# Patient Record
Sex: MALE | Race: WHITE | HISPANIC OR LATINO | URBAN - METROPOLITAN AREA
[De-identification: names, ages, dates, MRNs, and addresses within clinical notes are randomized per-mention and may not be internally consistent; named-entity substitution may affect disease eponyms.]

---

## 2018-01-12 ENCOUNTER — OFFICE (OUTPATIENT)
Dept: URBAN - METROPOLITAN AREA CLINIC 75 | Facility: CLINIC | Age: 23
End: 2018-01-12

## 2018-01-12 VITALS
WEIGHT: 156 LBS | HEIGHT: 65 IN | SYSTOLIC BLOOD PRESSURE: 120 MMHG | DIASTOLIC BLOOD PRESSURE: 70 MMHG | HEART RATE: 91 BPM

## 2018-01-12 DIAGNOSIS — R13.10 DYSPHAGIA, UNSPECIFIED: ICD-10-CM

## 2018-01-12 DIAGNOSIS — R11.0 NAUSEA: ICD-10-CM

## 2018-01-12 DIAGNOSIS — R63.4 ABNORMAL WEIGHT LOSS: ICD-10-CM

## 2018-01-12 DIAGNOSIS — R13.19 OTHER DYSPHAGIA: ICD-10-CM

## 2018-01-12 PROCEDURE — 99203 OFFICE O/P NEW LOW 30 MIN: CPT

## 2018-01-22 ENCOUNTER — ON CAMPUS - OUTPATIENT (OUTPATIENT)
Dept: URBAN - METROPOLITAN AREA HOSPITAL 108 | Facility: HOSPITAL | Age: 23
End: 2018-01-22
Payer: COMMERCIAL

## 2018-01-22 DIAGNOSIS — K29.70 GASTRITIS, UNSPECIFIED, WITHOUT BLEEDING: ICD-10-CM

## 2018-01-22 DIAGNOSIS — R13.19 OTHER DYSPHAGIA: ICD-10-CM

## 2018-01-22 PROCEDURE — 43450 DILATE ESOPHAGUS 1/MULT PASS: CPT

## 2018-01-22 PROCEDURE — 43239 EGD BIOPSY SINGLE/MULTIPLE: CPT

## 2021-01-29 ENCOUNTER — OFFICE VISIT (OUTPATIENT)
Dept: FAMILY MEDICINE CLINIC | Facility: CLINIC | Age: 26
End: 2021-01-29

## 2021-01-29 VITALS
HEIGHT: 71 IN | SYSTOLIC BLOOD PRESSURE: 123 MMHG | WEIGHT: 158.2 LBS | BODY MASS INDEX: 22.15 KG/M2 | OXYGEN SATURATION: 95 % | TEMPERATURE: 98.2 F | HEART RATE: 94 BPM | DIASTOLIC BLOOD PRESSURE: 73 MMHG

## 2021-01-29 DIAGNOSIS — K76.0 FATTY LIVER: ICD-10-CM

## 2021-01-29 DIAGNOSIS — M54.50 BILATERAL LOW BACK PAIN WITHOUT SCIATICA, UNSPECIFIED CHRONICITY: ICD-10-CM

## 2021-01-29 DIAGNOSIS — R35.0 URINARY FREQUENCY: Primary | ICD-10-CM

## 2021-01-29 PROCEDURE — 99203 OFFICE O/P NEW LOW 30 MIN: CPT | Performed by: FAMILY MEDICINE

## 2021-01-29 RX ORDER — BACLOFEN 10 MG/1
10 TABLET ORAL 3 TIMES DAILY PRN
Qty: 60 TABLET | Refills: 1 | Status: SHIPPED | OUTPATIENT
Start: 2021-01-29 | End: 2021-03-15 | Stop reason: SDUPTHER

## 2021-01-29 RX ORDER — CLONAZEPAM 0.5 MG/1
0.5 TABLET ORAL 2 TIMES DAILY
COMMUNITY
Start: 2021-01-03 | End: 2021-11-18

## 2021-01-29 RX ORDER — BACLOFEN 10 MG/1
1 TABLET ORAL 3 TIMES DAILY
COMMUNITY
Start: 2021-01-03 | End: 2021-01-29 | Stop reason: SDUPTHER
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
## 2021-01-31 LAB
25(OH)D3+25(OH)D2 SERPL-MCNC: 24 NG/ML (ref 30–100)
ALBUMIN SERPL-MCNC: 5.1 G/DL (ref 3.5–5.2)
ALBUMIN/GLOB SERPL: 2.2 G/DL
ALP SERPL-CCNC: 57 U/L (ref 39–117)
ALT SERPL-CCNC: 56 U/L (ref 1–41)
ANA SER QL: NEGATIVE
AST SERPL-CCNC: 24 U/L (ref 1–40)
BASOPHILS # BLD AUTO: 0.02 10*3/MM3 (ref 0–0.2)
BASOPHILS NFR BLD AUTO: 0.4 % (ref 0–1.5)
BILIRUB SERPL-MCNC: 0.5 MG/DL (ref 0–1.2)
BUN SERPL-MCNC: 12 MG/DL (ref 6–20)
BUN/CREAT SERPL: 15.4 (ref 7–25)
CALCIUM SERPL-MCNC: 9.8 MG/DL (ref 8.6–10.5)
CHLORIDE SERPL-SCNC: 103 MMOL/L (ref 98–107)
CK BB CFR SERPL ELPH: 0 %
CK MACRO1 CFR SERPL: 0 %
CK MACRO2 CFR SERPL: 0 %
CK MB CFR SERPL ELPH: 0 % (ref 0–3)
CK MM CFR SERPL ELPH: 100 % (ref 97–100)
CK SERPL-CCNC: 105 U/L (ref 49–439)
CO2 SERPL-SCNC: 30.8 MMOL/L (ref 22–29)
CREAT SERPL-MCNC: 0.78 MG/DL (ref 0.76–1.27)
EOSINOPHIL # BLD AUTO: 0.04 10*3/MM3 (ref 0–0.4)
EOSINOPHIL NFR BLD AUTO: 0.9 % (ref 0.3–6.2)
ERYTHROCYTE [DISTWIDTH] IN BLOOD BY AUTOMATED COUNT: 13.2 % (ref 12.3–15.4)
ERYTHROCYTE [SEDIMENTATION RATE] IN BLOOD BY WESTERGREN METHOD: 1 MM/HR (ref 0–15)
GLOBULIN SER CALC-MCNC: 2.3 GM/DL
GLUCOSE SERPL-MCNC: 86 MG/DL (ref 65–99)
HCT VFR BLD AUTO: 44.3 % (ref 37.5–51)
HGB BLD-MCNC: 15 G/DL (ref 13–17.7)
HSV1 IGG SER IA-ACNC: 5.79 INDEX (ref 0–0.9)
HSV2 IGG SER IA-ACNC: <0.91 INDEX (ref 0–0.9)
IMM GRANULOCYTES # BLD AUTO: 0.01 10*3/MM3 (ref 0–0.05)
IMM GRANULOCYTES NFR BLD AUTO: 0.2 % (ref 0–0.5)
LYMPHOCYTES # BLD AUTO: 1.78 10*3/MM3 (ref 0.7–3.1)
LYMPHOCYTES NFR BLD AUTO: 38.5 % (ref 19.6–45.3)
MCH RBC QN AUTO: 29.2 PG (ref 26.6–33)
MCHC RBC AUTO-ENTMCNC: 33.9 G/DL (ref 31.5–35.7)
MCV RBC AUTO: 86.4 FL (ref 79–97)
MONOCYTES # BLD AUTO: 0.32 10*3/MM3 (ref 0.1–0.9)
MONOCYTES NFR BLD AUTO: 6.9 % (ref 5–12)
NEUTROPHILS # BLD AUTO: 2.45 10*3/MM3 (ref 1.7–7)
NEUTROPHILS NFR BLD AUTO: 53.1 % (ref 42.7–76)
NRBC BLD AUTO-RTO: 0 /100 WBC (ref 0–0.2)
PLATELET # BLD AUTO: 278 10*3/MM3 (ref 140–450)
POTASSIUM SERPL-SCNC: 4.2 MMOL/L (ref 3.5–5.2)
PROT SERPL-MCNC: 7.4 G/DL (ref 6–8.5)
RBC # BLD AUTO: 5.13 10*6/MM3 (ref 4.14–5.8)
RHEUMATOID FACT SERPL-ACNC: <10 IU/ML (ref 0–13.9)
SODIUM SERPL-SCNC: 141 MMOL/L (ref 136–145)
TSH SERPL DL<=0.005 MIU/L-ACNC: 1.27 UIU/ML (ref 0.27–4.2)
URATE SERPL-MCNC: 4.7 MG/DL (ref 3.4–7)
VIT B12 SERPL-MCNC: 533 PG/ML (ref 211–946)
WBC # BLD AUTO: 4.62 10*3/MM3 (ref 3.4–10.8)

## 2021-02-01 ENCOUNTER — TELEPHONE (OUTPATIENT)
Dept: FAMILY MEDICINE CLINIC | Facility: CLINIC | Age: 26
End: 2021-02-01

## 2021-02-01 DIAGNOSIS — R79.89 LOW VITAMIN D LEVEL: Primary | ICD-10-CM

## 2021-02-01 DIAGNOSIS — K76.0 FATTY LIVER: Primary | ICD-10-CM

## 2021-02-01 RX ORDER — ERGOCALCIFEROL 1.25 MG/1
50000 CAPSULE ORAL
Qty: 12 CAPSULE | Refills: 1 | Status: SHIPPED | OUTPATIENT
Start: 2021-02-01 | End: 2021-07-31

## 2021-02-01 NOTE — TELEPHONE ENCOUNTER
RUPAL Jenkinsay for HUB to give message     vitamin D is low, vitamin D weekly sent, recheck in 6 weeks, ALT is elevated, patient needs to stop by fasting for cholesterol panel, also he is positive for herpes type I, cold sores, in the past, rest of the labs are all negative     Should patient have any questions, he can call Dr. Benitez

## 2021-02-01 NOTE — TELEPHONE ENCOUNTER
----- Message from Finesse Bauer MD sent at 2/1/2021  7:27 AM EST -----  Please call the patient regarding his abnormal result.  I did call patient, no answer, left a message to call me back, vitamin D is low, vitamin D weekly sent, recheck in 6 weeks, ALT is elevated, patient needs to stop by fasting for cholesterol panel, also he is positive for herpes type I, cold sores, in the past, rest of the labs are all negative

## 2021-02-03 ENCOUNTER — TELEPHONE (OUTPATIENT)
Dept: FAMILY MEDICINE CLINIC | Facility: CLINIC | Age: 26
End: 2021-02-03

## 2021-02-03 NOTE — TELEPHONE ENCOUNTER
beni Rosales for hub to read    Please call the patient regarding his  result.  Cholesterol panel is normal, due to elevated liver enzyme, I can order a  liver ultrasound patient is in agreement

## 2021-02-03 NOTE — TELEPHONE ENCOUNTER
----- Message from Finesse Bauer MD sent at 2/3/2021  7:21 AM EST -----  Please call the patient regarding his  result.  Cholesterol panel is normal, due to elevated liver enzyme, I can order a  liver ultrasound patient is in agreement

## 2021-03-12 ENCOUNTER — OFFICE VISIT (OUTPATIENT)
Dept: FAMILY MEDICINE CLINIC | Facility: CLINIC | Age: 26
End: 2021-03-12

## 2021-03-12 VITALS
HEIGHT: 71 IN | SYSTOLIC BLOOD PRESSURE: 130 MMHG | WEIGHT: 160 LBS | TEMPERATURE: 98 F | DIASTOLIC BLOOD PRESSURE: 60 MMHG | HEART RATE: 65 BPM | BODY MASS INDEX: 22.4 KG/M2 | OXYGEN SATURATION: 98 %

## 2021-03-12 DIAGNOSIS — R79.89 LOW VITAMIN D LEVEL: Primary | ICD-10-CM

## 2021-03-12 DIAGNOSIS — M54.50 BILATERAL LOW BACK PAIN WITHOUT SCIATICA, UNSPECIFIED CHRONICITY: ICD-10-CM

## 2021-03-12 PROBLEM — F41.9 ANXIETY: Status: ACTIVE | Noted: 2021-03-12

## 2021-03-12 PROBLEM — K31.84 GASTROPARESIS: Status: ACTIVE | Noted: 2021-03-12

## 2021-03-12 PROCEDURE — 99213 OFFICE O/P EST LOW 20 MIN: CPT | Performed by: FAMILY MEDICINE

## 2021-03-12 NOTE — PROGRESS NOTES
"Chief Complaint  Follow-up (6 week )    Subjective          Brian Osorio presents to [facility]  History of Present Illness  Saw Neurosurgeon and was told he is confused to have a Nerudiagnostic and EMG also seen by GI having a swallowing problems, nobody know what happens with him, was told by a neurosurgeon had a Phyllym??? Thickening of the filum 3.5 mm terminal, has low back pain but no incontinence, but Urgency, wanst a second opinion and no tingling/numbness  Objective   Vital Signs:   /60 (BP Location: Right arm, Patient Position: Sitting)   Pulse 65   Temp 98 °F (36.7 °C)   Ht 180.3 cm (70.98\")   Wt 72.6 kg (160 lb)   SpO2 98%   BMI 22.33 kg/m²     Physical Exam  Vitals and nursing note reviewed.   Constitutional:       Appearance: He is well-developed.   HENT:      Head: Normocephalic and atraumatic.      Right Ear: External ear normal.      Left Ear: External ear normal.      Nose: Nose normal.   Eyes:      General: No scleral icterus.        Right eye: No discharge.         Left eye: No discharge.      Conjunctiva/sclera: Conjunctivae normal.      Pupils: Pupils are equal, round, and reactive to light.   Neck:      Thyroid: No thyromegaly.      Vascular: No JVD.      Trachea: No tracheal deviation.   Cardiovascular:      Rate and Rhythm: Normal rate and regular rhythm.      Heart sounds: Normal heart sounds. No murmur. No friction rub. No gallop.    Pulmonary:      Effort: Pulmonary effort is normal. No respiratory distress.      Breath sounds: Normal breath sounds. No wheezing or rales.   Chest:      Chest wall: No tenderness.   Abdominal:      General: Bowel sounds are normal. There is no distension.      Palpations: Abdomen is soft. There is no mass.      Tenderness: There is no abdominal tenderness. There is no guarding or rebound.      Hernia: No hernia is present.   Musculoskeletal:         General: No tenderness. Normal range of motion.      " Cervical back: Normal range of motion and neck supple.   Lymphadenopathy:      Cervical: No cervical adenopathy.   Skin:     General: Skin is warm and dry.   Neurological:      General: No focal deficit present.      Mental Status: He is alert.      Cranial Nerves: No cranial nerve deficit.      Sensory: No sensory deficit.      Motor: No abnormal muscle tone.      Coordination: Coordination normal.      Deep Tendon Reflexes: Reflexes normal.      Comments: Normal monofilament   Psychiatric:         Behavior: Behavior normal.         Thought Content: Thought content normal.         Judgment: Judgment normal.        Result Review :                 Assessment and Plan    Diagnoses and all orders for this visit:    1. Low vitamin D level (Primary)  -     Cancel: Vitamin D 25 hydroxy  -     Comprehensive Metabolic Panel  -     Vitamin D 25 hydroxy    2. Bilateral low back pain without sciatica, unspecified chronicity  -     Ambulatory Referral to Neurosurgery        Follow Up   No follow-ups on file.  Patient was given instructions and counseling regarding his condition or for health maintenance advice. Please see specific information pulled into the AVS if appropriate.

## 2021-03-13 LAB
25(OH)D3+25(OH)D2 SERPL-MCNC: 51.9 NG/ML (ref 30–100)
ALBUMIN SERPL-MCNC: 4.9 G/DL (ref 4.1–5.2)
ALBUMIN/GLOB SERPL: 2 {RATIO} (ref 1.2–2.2)
ALP SERPL-CCNC: 69 IU/L (ref 39–117)
ALT SERPL-CCNC: 39 IU/L (ref 0–44)
AST SERPL-CCNC: 22 IU/L (ref 0–40)
BILIRUB SERPL-MCNC: 0.4 MG/DL (ref 0–1.2)
BUN SERPL-MCNC: 14 MG/DL (ref 6–20)
BUN/CREAT SERPL: 16 (ref 9–20)
CALCIUM SERPL-MCNC: 9.8 MG/DL (ref 8.7–10.2)
CHLORIDE SERPL-SCNC: 101 MMOL/L (ref 96–106)
CO2 SERPL-SCNC: 28 MMOL/L (ref 20–29)
CREAT SERPL-MCNC: 0.88 MG/DL (ref 0.76–1.27)
GLOBULIN SER CALC-MCNC: 2.5 G/DL (ref 1.5–4.5)
GLUCOSE SERPL-MCNC: 101 MG/DL (ref 65–99)
MAGNESIUM SERPL-MCNC: 2.3 MG/DL (ref 1.6–2.3)
POTASSIUM SERPL-SCNC: 3.9 MMOL/L (ref 3.5–5.2)
PROT SERPL-MCNC: 7.4 G/DL (ref 6–8.5)
SODIUM SERPL-SCNC: 142 MMOL/L (ref 134–144)
VIT B12 SERPL-MCNC: 539 PG/ML (ref 232–1245)

## 2021-03-15 ENCOUNTER — TELEPHONE (OUTPATIENT)
Dept: FAMILY MEDICINE CLINIC | Facility: CLINIC | Age: 26
End: 2021-03-15

## 2021-03-15 DIAGNOSIS — M54.50 BILATERAL LOW BACK PAIN WITHOUT SCIATICA, UNSPECIFIED CHRONICITY: ICD-10-CM

## 2021-03-15 RX ORDER — BACLOFEN 10 MG/1
10 TABLET ORAL 3 TIMES DAILY PRN
Qty: 60 TABLET | Refills: 1 | Status: SHIPPED | OUTPATIENT
Start: 2021-03-15 | End: 2021-04-16 | Stop reason: SDUPTHER

## 2021-03-15 NOTE — TELEPHONE ENCOUNTER
Caller: Brian Zhu    Relationship: Self    Best call back number: [phone] (H)  Medication needed:   Requested Prescriptions     Pending Prescriptions Disp Refills   • baclofen (LIORESAL) 10 MG tablet 60 tablet 1     Sig: Take 1 tablet by mouth 3 (Three) Times a Day As Needed for Muscle Spasms (or pain).       When do you need the refill by: 03/15/2021    What details did the patient provide when requesting the medication: PATIENT HAS 2 DAYS LEFT    PATIENT ALSO INQUIRING ABOUT REFERRAL BEING SENT TO DR URBAN FOR NEURO SURGERY    Does the patient have less than a 3 day supply:  [x] Yes  [] No    What is the patient's preferred pharmacy: [id number] - [place], [place] - [address] AT [facility] [phone] [facility] [phone]

## 2021-03-15 NOTE — TELEPHONE ENCOUNTER
Spoke with patient regarding lab results, let him know to continue Vitamin D. Patient verbalized understanding

## 2021-03-15 NOTE — TELEPHONE ENCOUNTER
----- Message from Finesse Bauer MD sent at 3/13/2021 10:48 AM EST -----  Please call the patient regarding his abnormal result. Sugar slightly elevated, but Vitamin D and ALT back to normal, continue Vitamin D and recheck in 2 months, I called pt no answer, LMTCB

## 2021-04-16 ENCOUNTER — BULK ORDERING (OUTPATIENT)
Dept: CASE MANAGEMENT | Facility: OTHER | Age: 26
End: 2021-04-16

## 2021-04-16 DIAGNOSIS — Z23 IMMUNIZATION DUE: ICD-10-CM

## 2021-04-16 DIAGNOSIS — M54.50 BILATERAL LOW BACK PAIN WITHOUT SCIATICA, UNSPECIFIED CHRONICITY: ICD-10-CM

## 2021-04-17 ENCOUNTER — IMMUNIZATION (OUTPATIENT)
Dept: VACCINE CLINIC | Facility: HOSPITAL | Age: 26
End: 2021-04-17

## 2021-04-17 DIAGNOSIS — Z23 IMMUNIZATION DUE: ICD-10-CM

## 2021-04-17 PROCEDURE — 0001A: CPT | Performed by: INTERNAL MEDICINE

## 2021-04-17 PROCEDURE — 91300 HC SARSCOV02 VAC 30MCG/0.3ML IM: CPT | Performed by: INTERNAL MEDICINE

## 2021-04-19 RX ORDER — BACLOFEN 10 MG/1
10 TABLET ORAL 3 TIMES DAILY PRN
Qty: 60 TABLET | Refills: 1 | Status: SHIPPED | OUTPATIENT
Start: 2021-04-19 | End: 2021-06-10 | Stop reason: SDUPTHER

## 2021-04-26 ENCOUNTER — TELEPHONE (OUTPATIENT)
Dept: FAMILY MEDICINE CLINIC | Facility: CLINIC | Age: 26
End: 2021-04-26

## 2021-04-26 DIAGNOSIS — M54.50 BILATERAL LOW BACK PAIN WITHOUT SCIATICA, UNSPECIFIED CHRONICITY: Primary | ICD-10-CM

## 2021-05-03 ENCOUNTER — TELEMEDICINE (OUTPATIENT)
Dept: FAMILY MEDICINE CLINIC | Facility: CLINIC | Age: 26
End: 2021-05-03

## 2021-05-03 ENCOUNTER — TELEPHONE (OUTPATIENT)
Dept: FAMILY MEDICINE CLINIC | Facility: CLINIC | Age: 26
End: 2021-05-03

## 2021-05-03 DIAGNOSIS — M54.50 BILATERAL LOW BACK PAIN WITHOUT SCIATICA, UNSPECIFIED CHRONICITY: Primary | ICD-10-CM

## 2021-05-03 PROCEDURE — 99213 OFFICE O/P EST LOW 20 MIN: CPT | Performed by: FAMILY MEDICINE

## 2021-05-03 RX ORDER — ETODOLAC 200 MG/1
200 CAPSULE ORAL EVERY 8 HOURS PRN
Qty: 30 CAPSULE | Refills: 1 | Status: SHIPPED | OUTPATIENT
Start: 2021-05-03 | End: 2021-05-05

## 2021-05-03 NOTE — PROGRESS NOTES
Chief Complaint  No chief complaint on file.  Low back pain  Subjective          Christopher Geremias Osorio presents to [facility]  History of Present Illness  Pt agreed to be contacted by Martina  Wants a second opinion on low back pain, occult case of fixed medulla? To see a Neurosurgeon on [place],needs xrays  Lumbar xray flexion/extension, still low back has appt in May 19, rated 10/10, saw pain mgt  Objective   Vital Signs:   There were no vitals taken for this visit.      Result Review :                 Assessment and Plan    Diagnoses and all orders for this visit:    1. Bilateral low back pain without sciatica, unspecified chronicity (Primary)  -     XR Spine Lumbar Complete With Flex & Ext; Future  -     etodolac (LODINE) 200 MG capsule; Take 1 capsule by mouth Every 8 (Eight) Hours As Needed (pain).  Dispense: 30 capsule; Refill: 1        Follow Up   Return if symptoms worsen or fail to improve.  Patient was given instructions and counseling regarding his condition or for health maintenance advice. Please see specific information pulled into the AVS if appropriate.     Side effects discussed with patient in detail, all including but not limited to every single topic discussed   Time spent 20 minutes

## 2021-05-03 NOTE — TELEPHONE ENCOUNTER
Caller: Brian Zhu    Relationship to patient: Self    Best call back number: [phone]    Patient is needing: PATIENT CALLED STATING HE WOULD LIKE TO SPEAK WITH  ABOUT SOME MEDICAL CONCERNS THE PATIENT HAS.

## 2021-05-04 ENCOUNTER — PRIOR AUTHORIZATION (OUTPATIENT)
Dept: FAMILY MEDICINE CLINIC | Facility: CLINIC | Age: 26
End: 2021-05-04

## 2021-05-05 ENCOUNTER — CLINICAL SUPPORT (OUTPATIENT)
Dept: FAMILY MEDICINE CLINIC | Facility: CLINIC | Age: 26
End: 2021-05-05

## 2021-05-05 DIAGNOSIS — M54.50 BILATERAL LOW BACK PAIN WITHOUT SCIATICA, UNSPECIFIED CHRONICITY: Primary | ICD-10-CM

## 2021-05-08 ENCOUNTER — IMMUNIZATION (OUTPATIENT)
Dept: VACCINE CLINIC | Facility: HOSPITAL | Age: 26
End: 2021-05-08

## 2021-05-08 PROCEDURE — 0002A: CPT | Performed by: INTERNAL MEDICINE

## 2021-05-08 PROCEDURE — 91300 HC SARSCOV02 VAC 30MCG/0.3ML IM: CPT | Performed by: INTERNAL MEDICINE

## 2021-05-19 ENCOUNTER — TELEPHONE (OUTPATIENT)
Dept: FAMILY MEDICINE CLINIC | Facility: CLINIC | Age: 26
End: 2021-05-19

## 2021-05-19 DIAGNOSIS — N31.9 NEUROGENIC BLADDER: Primary | ICD-10-CM

## 2021-06-10 DIAGNOSIS — M54.50 BILATERAL LOW BACK PAIN WITHOUT SCIATICA, UNSPECIFIED CHRONICITY: ICD-10-CM

## 2021-06-10 RX ORDER — BACLOFEN 10 MG/1
10 TABLET ORAL 3 TIMES DAILY PRN
Qty: 60 TABLET | Refills: 1 | Status: SHIPPED | OUTPATIENT
Start: 2021-06-10 | End: 2021-07-21 | Stop reason: SDUPTHER

## 2021-06-11 DIAGNOSIS — M54.50 BILATERAL LOW BACK PAIN WITHOUT SCIATICA, UNSPECIFIED CHRONICITY: ICD-10-CM

## 2021-06-11 RX ORDER — TOPIRAMATE 25 MG/1
TABLET ORAL
COMMUNITY
Start: 2021-05-03 | End: 2021-07-21

## 2021-06-11 RX ORDER — TAMSULOSIN HYDROCHLORIDE 0.4 MG/1
1 CAPSULE ORAL DAILY
COMMUNITY
Start: 2021-05-07 | End: 2021-07-21 | Stop reason: ALTCHOICE

## 2021-06-11 RX ORDER — BACLOFEN 10 MG/1
TABLET ORAL
Qty: 60 TABLET | Refills: 1 | OUTPATIENT
Start: 2021-06-11

## 2021-07-19 ENCOUNTER — TELEPHONE (OUTPATIENT)
Dept: FAMILY MEDICINE CLINIC | Facility: CLINIC | Age: 26
End: 2021-07-19

## 2021-07-19 NOTE — TELEPHONE ENCOUNTER
HUB CAN READ    Left message for patient to call back for Neurosurgery appointment info    8/10/21 AT 10:40 AM with Katie Casale, APRN    [facility]  [address]  First floor - [address]  [address]  [place] [address]

## 2021-07-21 ENCOUNTER — TELEMEDICINE (OUTPATIENT)
Dept: FAMILY MEDICINE CLINIC | Facility: CLINIC | Age: 26
End: 2021-07-21

## 2021-07-21 DIAGNOSIS — Q79.60 EHLERS-DANLOS DISEASE: Primary | ICD-10-CM

## 2021-07-21 DIAGNOSIS — R25.2 SPASM: ICD-10-CM

## 2021-07-21 DIAGNOSIS — M54.50 BILATERAL LOW BACK PAIN WITHOUT SCIATICA, UNSPECIFIED CHRONICITY: ICD-10-CM

## 2021-07-21 PROCEDURE — 99213 OFFICE O/P EST LOW 20 MIN: CPT | Performed by: FAMILY MEDICINE

## 2021-07-21 RX ORDER — BACLOFEN 20 MG/1
20 TABLET ORAL 3 TIMES DAILY PRN
Qty: 90 TABLET | Refills: 1 | Status: SHIPPED | OUTPATIENT
Start: 2021-07-21 | End: 2021-09-15

## 2021-07-21 NOTE — PROGRESS NOTES
Chief Complaint    Villa-Danlos  Subjective          Shaheeder Geremias Osorio presents to [facility]  History of Present Illness  Patient agreed to be contacted by Zoom  Went to [place] to see a Doctor, Neurosurgeon, and had discussion with a group of Doctors, no fxhx, no scoliosis or lordosis, no elasticity on joints, was told 100 % sure he has also has spasms on his lower back, would like to increase baclofen to 20 mg 3 times daily as needed, patient already went ahead and started taking 2 tabs of 10 mg 3 times a days  X a week discussed with patient the proper way to do it  is usually 15  mg every 3 days slowly increasing  Objective   Vital Signs:   There were no vitals taken for this visit.      Result Review :                 Assessment and Plan    Diagnoses and all orders for this visit:    1. Villa-Danlos disease (Primary)  -     Cancel: Ambulatory Referral to Rheumatology  -     Cancel: Ambulatory Referral to Rheumatology  -     Ambulatory Referral to Rheumatology    2. Bilateral low back pain without sciatica, unspecified chronicity  -     baclofen (LIORESAL) 20 MG tablet; Take 1 tablet by mouth 3 (Three) Times a Day As Needed for Muscle Spasms (or pain).  Dispense: 90 tablet; Refill: 1    3. Spasm  -     baclofen (LIORESAL) 20 MG tablet; Take 1 tablet by mouth 3 (Three) Times a Day As Needed for Muscle Spasms (or pain).  Dispense: 90 tablet; Refill: 1        Follow Up   No follow-ups on file.  Patient was given instructions and counseling regarding his condition or for health maintenance advice. Please see specific information pulled into the AVS if appropriate.     Time spent 15 minutes

## 2021-07-22 DIAGNOSIS — M54.50 BILATERAL LOW BACK PAIN WITHOUT SCIATICA, UNSPECIFIED CHRONICITY: ICD-10-CM

## 2021-07-22 RX ORDER — BACLOFEN 10 MG/1
TABLET ORAL
Qty: 60 TABLET | Refills: 1 | OUTPATIENT
Start: 2021-07-22

## 2021-07-26 ENCOUNTER — TRANSCRIBE ORDERS (OUTPATIENT)
Dept: FAMILY MEDICINE CLINIC | Facility: CLINIC | Age: 26
End: 2021-07-26

## 2021-07-26 DIAGNOSIS — Q79.60 EHLERS-DANLOS DISEASE: Primary | ICD-10-CM

## 2021-09-15 DIAGNOSIS — M54.50 BILATERAL LOW BACK PAIN WITHOUT SCIATICA, UNSPECIFIED CHRONICITY: ICD-10-CM

## 2021-09-15 DIAGNOSIS — R25.2 SPASM: ICD-10-CM

## 2021-09-15 RX ORDER — BACLOFEN 20 MG/1
TABLET ORAL
Qty: 90 TABLET | Refills: 1 | Status: SHIPPED | OUTPATIENT
Start: 2021-09-15 | End: 2021-11-18

## 2021-11-18 ENCOUNTER — OFFICE VISIT (OUTPATIENT)
Dept: FAMILY MEDICINE CLINIC | Facility: CLINIC | Age: 26
End: 2021-11-18

## 2021-11-18 VITALS
BODY MASS INDEX: 23.62 KG/M2 | DIASTOLIC BLOOD PRESSURE: 66 MMHG | WEIGHT: 165 LBS | SYSTOLIC BLOOD PRESSURE: 110 MMHG | HEIGHT: 70 IN | TEMPERATURE: 97.5 F | HEART RATE: 76 BPM | OXYGEN SATURATION: 99 %

## 2021-11-18 DIAGNOSIS — M54.50 BILATERAL LOW BACK PAIN WITHOUT SCIATICA, UNSPECIFIED CHRONICITY: Primary | ICD-10-CM

## 2021-11-18 DIAGNOSIS — R79.89 LOW VITAMIN D LEVEL: ICD-10-CM

## 2021-11-18 DIAGNOSIS — F33.0 MILD EPISODE OF RECURRENT MAJOR DEPRESSIVE DISORDER (HCC): ICD-10-CM

## 2021-11-18 DIAGNOSIS — F32.0 CURRENT MILD EPISODE OF MAJOR DEPRESSIVE DISORDER WITHOUT PRIOR EPISODE (HCC): ICD-10-CM

## 2021-11-18 DIAGNOSIS — Z00.00 WELLNESS EXAMINATION: ICD-10-CM

## 2021-11-18 PROCEDURE — 99213 OFFICE O/P EST LOW 20 MIN: CPT | Performed by: FAMILY MEDICINE

## 2021-11-18 RX ORDER — AMITRIPTYLINE HYDROCHLORIDE 25 MG/1
25 TABLET, FILM COATED ORAL NIGHTLY
Qty: 30 TABLET | Refills: 1 | Status: SHIPPED | OUTPATIENT
Start: 2021-11-18 | End: 2022-03-21

## 2021-11-18 RX ORDER — MIRABEGRON 25 MG/1
TABLET, FILM COATED, EXTENDED RELEASE ORAL
COMMUNITY
Start: 2021-11-16 | End: 2022-03-21

## 2021-11-18 NOTE — PROGRESS NOTES
"Chief Complaint  Medical Clearance (spine surgery)    Subjective          Brian Osorio presents to [facility]  History of Present Illness  Had Surgery already in [facility],  [facility] on 11/4/21,  Sacrectomy and Filum Terrminale, Dx of Neuro cranium vertebral Sx Filum Disease, impaction of Cerebellar tonsils and Multiple disc disease, now feels better less back pain and less hip pain,  Seen by Urologist x frequent Urine , on Diclofenac x pain, got Flu shot , doing exercises, after Sx with Depression, this month, wants Amitryptiline, no anxiety,n not suicidal ideation  Objective   Vital Signs:   /66   Pulse 76   Temp 97.5 °F (36.4 °C) (Infrared)   Ht 177.8 cm (70\")   Wt 74.8 kg (165 lb)   SpO2 99%   BMI 23.68 kg/m²     Physical Exam  Vitals and nursing note reviewed.   Constitutional:       Appearance: He is well-developed.   HENT:      Head: Normocephalic and atraumatic.      Right Ear: External ear normal.      Left Ear: External ear normal.      Nose: Nose normal.   Eyes:      General: No scleral icterus.        Right eye: No discharge.         Left eye: No discharge.      Conjunctiva/sclera: Conjunctivae normal.      Pupils: Pupils are equal, round, and reactive to light.   Neck:      Thyroid: No thyromegaly.      Vascular: No JVD.      Trachea: No tracheal deviation.   Cardiovascular:      Rate and Rhythm: Normal rate and regular rhythm.      Heart sounds: Normal heart sounds. No murmur heard.  No friction rub. No gallop.    Pulmonary:      Effort: Pulmonary effort is normal. No respiratory distress.      Breath sounds: Normal breath sounds. No wheezing or rales.   Chest:      Chest wall: No tenderness.   Abdominal:      General: Bowel sounds are normal. There is no distension.      Palpations: Abdomen is soft. There is no mass.      Tenderness: There is no abdominal tenderness. There is no guarding or rebound.      Hernia: No hernia is present. "   Musculoskeletal:         General: No tenderness. Normal range of motion.      Cervical back: Normal range of motion and neck supple.   Lymphadenopathy:      Cervical: No cervical adenopathy.   Skin:     General: Skin is warm and dry.   Neurological:      Mental Status: He is alert.      Cranial Nerves: No cranial nerve deficit.      Sensory: No sensory deficit.      Motor: No abnormal muscle tone.      Coordination: Coordination normal.      Deep Tendon Reflexes: Reflexes normal.   Psychiatric:         Behavior: Behavior normal.         Thought Content: Thought content normal.         Judgment: Judgment normal.        Result Review :                 Assessment and Plan    Diagnoses and all orders for this visit:    1. Bilateral low back pain without sciatica, unspecified chronicity (Primary)  Comments:  had a succesful outcome after Sx    2. Low vitamin D level  -     CBC & Differential  -     Comprehensive Metabolic Panel  -     Lipid Panel  -     Vitamin D 25 hydroxy    3. Wellness examination  -     CBC & Differential  -     Comprehensive Metabolic Panel  -     Lipid Panel  -     Vitamin D 25 hydroxy    4. Mild episode of recurrent major depressive disorder (HCC)  -     Cancel: Ambulatory Referral to Psychiatry    5. Current mild episode of major depressive disorder without prior episode (HCC)  -     amitriptyline (ELAVIL) 25 MG tablet; Take 1 tablet by mouth Every Night.  Dispense: 30 tablet; Refill: 1        Follow Up   Return in about 4 weeks (around 12/16/2021).  Patient was given instructions and counseling regarding his condition or for health maintenance advice. Please see specific information pulled into the AVS if appropriate.     Declined psych referral since he can get the med here  Side effects discussed with patient in detail, all including but not limited to every single topic discussed

## 2021-11-19 ENCOUNTER — TELEPHONE (OUTPATIENT)
Dept: FAMILY MEDICINE CLINIC | Facility: CLINIC | Age: 26
End: 2021-11-19

## 2021-11-19 DIAGNOSIS — R79.89 LOW VITAMIN D LEVEL: Primary | ICD-10-CM

## 2021-11-19 DIAGNOSIS — R74.01 ALT (SGPT) LEVEL RAISED: ICD-10-CM

## 2021-11-19 LAB
25(OH)D3+25(OH)D2 SERPL-MCNC: 21.3 NG/ML (ref 30–100)
ALBUMIN SERPL-MCNC: 5 G/DL (ref 4.1–5.2)
ALBUMIN/GLOB SERPL: 2.1 {RATIO} (ref 1.2–2.2)
ALP SERPL-CCNC: 59 IU/L (ref 44–121)
ALT SERPL-CCNC: 112 IU/L (ref 0–44)
AST SERPL-CCNC: 33 IU/L (ref 0–40)
BASOPHILS # BLD AUTO: 0 X10E3/UL (ref 0–0.2)
BASOPHILS NFR BLD AUTO: 0 %
BILIRUB SERPL-MCNC: 0.4 MG/DL (ref 0–1.2)
BUN SERPL-MCNC: 15 MG/DL (ref 6–20)
BUN/CREAT SERPL: 16 (ref 9–20)
CALCIUM SERPL-MCNC: 9.7 MG/DL (ref 8.7–10.2)
CHLORIDE SERPL-SCNC: 102 MMOL/L (ref 96–106)
CHOLEST SERPL-MCNC: 180 MG/DL (ref 100–199)
CO2 SERPL-SCNC: 26 MMOL/L (ref 20–29)
CREAT SERPL-MCNC: 0.92 MG/DL (ref 0.76–1.27)
EOSINOPHIL # BLD AUTO: 0.1 X10E3/UL (ref 0–0.4)
EOSINOPHIL NFR BLD AUTO: 2 %
ERYTHROCYTE [DISTWIDTH] IN BLOOD BY AUTOMATED COUNT: 13 % (ref 11.6–15.4)
GLOBULIN SER CALC-MCNC: 2.4 G/DL (ref 1.5–4.5)
GLUCOSE SERPL-MCNC: 91 MG/DL (ref 65–99)
HCT VFR BLD AUTO: 46.1 % (ref 37.5–51)
HDLC SERPL-MCNC: 46 MG/DL
HGB BLD-MCNC: 14.8 G/DL (ref 13–17.7)
IMM GRANULOCYTES # BLD AUTO: 0.1 X10E3/UL (ref 0–0.1)
IMM GRANULOCYTES NFR BLD AUTO: 1 %
LDLC SERPL CALC-MCNC: 103 MG/DL (ref 0–99)
LYMPHOCYTES # BLD AUTO: 2.3 X10E3/UL (ref 0.7–3.1)
LYMPHOCYTES NFR BLD AUTO: 33 %
MCH RBC QN AUTO: 28.2 PG (ref 26.6–33)
MCHC RBC AUTO-ENTMCNC: 32.1 G/DL (ref 31.5–35.7)
MCV RBC AUTO: 88 FL (ref 79–97)
MONOCYTES # BLD AUTO: 0.7 X10E3/UL (ref 0.1–0.9)
MONOCYTES NFR BLD AUTO: 9 %
NEUTROPHILS # BLD AUTO: 3.9 X10E3/UL (ref 1.4–7)
NEUTROPHILS NFR BLD AUTO: 55 %
PLATELET # BLD AUTO: 341 X10E3/UL (ref 150–450)
POTASSIUM SERPL-SCNC: 4.4 MMOL/L (ref 3.5–5.2)
PROT SERPL-MCNC: 7.4 G/DL (ref 6–8.5)
RBC # BLD AUTO: 5.24 X10E6/UL (ref 4.14–5.8)
SODIUM SERPL-SCNC: 143 MMOL/L (ref 134–144)
TRIGL SERPL-MCNC: 176 MG/DL (ref 0–149)
VLDLC SERPL CALC-MCNC: 31 MG/DL (ref 5–40)
WBC # BLD AUTO: 7.1 X10E3/UL (ref 3.4–10.8)

## 2021-11-19 RX ORDER — ERGOCALCIFEROL 1.25 MG/1
50000 CAPSULE ORAL
Qty: 12 CAPSULE | Refills: 1 | Status: SHIPPED | OUTPATIENT
Start: 2021-11-19 | End: 2022-03-22

## 2021-11-19 NOTE — TELEPHONE ENCOUNTER
Patient states he was returning Dr Lane's call.  Would like a call back please, phone number is [phone].

## 2021-11-22 DIAGNOSIS — R74.01 ALT (SGPT) LEVEL RAISED: Primary | ICD-10-CM

## 2021-11-29 DIAGNOSIS — R74.01 ALT (SGPT) LEVEL RAISED: ICD-10-CM

## 2021-11-30 LAB
HAV IGM SERPL QL IA: NEGATIVE
HBV CORE IGM SERPL QL IA: NEGATIVE
HBV SURFACE AG SERPL QL IA: NEGATIVE
HCV AB S/CO SERPL IA: 0.1 S/CO RATIO (ref 0–0.9)

## 2021-12-01 ENCOUNTER — PATIENT MESSAGE (OUTPATIENT)
Dept: FAMILY MEDICINE CLINIC | Facility: CLINIC | Age: 26
End: 2021-12-01

## 2022-03-16 PROBLEM — R07.89 ATYPICAL CHEST PAIN: Status: ACTIVE | Noted: 2018-03-27

## 2022-03-16 PROBLEM — R79.89 ABNORMAL LIVER FUNCTION TESTS: Status: ACTIVE | Noted: 2019-11-23

## 2022-03-16 PROBLEM — R10.9 ABDOMINAL DISCOMFORT: Status: ACTIVE | Noted: 2018-11-08

## 2022-03-16 NOTE — PROGRESS NOTES
[facility]  [facility]  [address]  [facility] [address] [phone]   Initial Behavioral Health Note  Subjective   Brian Osorio is a 26 y.o. male who presents for initial evaluation    Chief Complaint:  ***  HPI:  ***  Prior Psychiatric Medication Trials:  · ***    Psychiatric History:  Past Diagnosis: ***    Prior Treatments: ***    Past Provider: ***    Psych Hospitalizations: ***    Suicide Attempts: ***    Violence/Legal Issues: ***    Abuse/Trauma: ***    Psychiatric Review Of Systems:    Depression:   [] No S/S reported   [] Pt reports S/S that are consistent with a depressive episode and include:  [] Low mood daily for all or most of day for > 2 weeks  [] Sleep changes  [] Initiation  [] Maintenance [] Hypersomnia  [] Anhedonia/Diminished Interest  [] Guilt  [] Low energy  [] Diminished Concentration  [] Appetite Changes []  Increased []  Decreased  [] Wt Changes  [] Psychomotor changes []  Slowing / Retardation []  Increased / Agitation  [] Suicidal ideation    Anxiety:   []  No S/S reported  []  S/S Occur nearly every day and > 6 month in duration  []  Excessive Worry       []  Restless/edgy [] Easily fatigued  []Muscle tension  []  Poor sleep/insomnia []  Decreased concentration    Panic:  [] No S/S reported  [] Pt reports S/S that are consistent with a panic attacks, symptoms included:   [] Shortness of breath [] Chest tightness   [] Feelings of dread  [] Sweating    [] Hyperventilating  [] Avoidance behavior    PTSD:   [] No S/S reported  [] Pt reports S/S that are consistent with a panic attacks, symptoms included:       [] Trauma/Event experienced/witness  [] Persistent re-experiencing       [] Dreams/Nightmares   [] Flashbacks       [] Avoidance behavior   [] Hyper-arousal       [] Increased Vigilance   [] Easily startled    Bipolar:   [] No S/S reported  [] S/S of zoe reported and have lasted > consecutive 7 days  [] Increased  energy  [] Increase in goal directed activity [] Impulsivity                [] Decreased need for sleep [] Grandiosity  [] Talkative  [] Risk taking behavior [] Pressured speech [] Flight of ideas    Psychosis:   [] No S/S reported  [] Hallucinations       [] Auditory  []  Visual []  Tactile       [] Gustatory[]  Olfactory  [] Overt Delusions:       []  Paranoia []  Persecution []  Somatic       []  Self-referential []  Thought blocking  []  Thought insertion       []  Thought withdrawal [] Disorganized speech/behavior    Substance Abuse:  [] No S/S reported  Eating D/O: [] No S/S reported  Personality D/O: [] No S/S reported  OCD:  [] No S/S reported    The following portions of the patient's history were reviewed and updated as appropriate: allergies, current medications, past family history, past medical history, past surgical history and problem list.    Patient Active Problem List   Diagnosis   • Urinary frequency   • Bilateral low back pain without sciatica   • Fatty liver   • Anxiety   • Gastroparesis   • Villa-Danlos disease   • Spasm   • Low vitamin D level   • Wellness examination   • Mild episode of recurrent major depressive disorder (HCC)   • Current mild episode of major depressive disorder without prior episode (HCC)   • Abdominal discomfort   • Atypical chest pain   • Abnormal liver function tests       No past medical history on file.  No past medical history pertinent negatives.  Surgical History   has a past surgical history that includes Appendectomy.   Family History  family history is not on file.  Social History     Social History Narrative   • Not on file      Social History     Tobacco Use   • Smoking status: Never Smoker   • Smokeless tobacco: Never Used   Substance Use Topics   • Alcohol use: Never   • Drug use: Never        Review of Systems   Constitutional: Negative for chills and fever.   Respiratory: Negative for chest tightness and shortness of breath.    Cardiovascular: Negative  for chest pain and palpitations.   Musculoskeletal: Negative for gait problem.   Psychiatric/Behavioral: Negative for self-injury and suicidal ideas.   Review of Systems    Objective     Physical Exam  Constitutional:       Appearance: Normal appearance, clothing appropriate for age, no acute distress  Musculoskeletal:         General: No problems with ambulation reported  Facial Expression:      Speech: Normal clear concise, no slurring or tics observed.  Respiratory      Breaths appear even and unlabored, no cough observed       Vitals  There were no vitals taken for this visit.  There is no height or weight on file to calculate BMI.     No results found for this or any previous visit (from the past 2016 hour(s)).  {Ambulatory Labs (Optional):66915}    Allergies  Allergies   Allergen Reactions   • Azithromycin Other (See Comments)     weakness   • Dexlansoprazole Itching      Medications  Current Outpatient Medications   Medication Sig Dispense Refill   • amitriptyline (ELAVIL) 25 MG tablet Take 1 tablet by mouth Every Night. 30 tablet 1   • diclofenac (VOLTAREN) 50 MG EC tablet TAKE 1 TABLET BY MOUTH TWICE DAILY AS NEEDED FOR PAIN 60 tablet 1   • Myrbetriq 25 MG tablet sustained-release 24 hour 24 hr tablet      • vitamin D (ERGOCALCIFEROL) 1.25 MG (22131 UT) capsule capsule Take 1 capsule by mouth Every 7 (Seven) Days for 180 days. 12 capsule 1     No current facility-administered medications for this visit.     Mental Status Exam:   Hygiene:   good  Cooperation:  Cooperative  Eye Contact:  Good  Psychomotor Behavior:  Appropriate  Affect:  ***  Hopelessness: Denies  Speech:  Normal  Thought Process:  Goal directed  Thought Content:  Normal  Suicidal:  None  Homicidal:  None  Hallucinations:  ***  Delusion:  ***  Memory:  Intact  Orientation:  Person, Place, Time and Situation  Reliability:  fair  Insight:  Fair  Judgement:  ***  Impulse Control:  Fair  Physical/Medical Issues:  Yes Reviewed    Assessment/Plan      Problem List Items Addressed This Visit    None        A thorough discussion was had that included review of disease process, need for continued monitoring and additional treatment options including use of pharmacological and non-pharmacological approaches to care, decisions were made and agreed upon by patient and provider. Discussed the risks, benefits, and potential side effects of the medications; patient ackowledged and verbally consented. Patient is advised to avoid driving or operating heavy machinery if they feel drowsy or over sedated. Patient is agreeable to call the office with any worsening of symptoms or onset of intolerable side effects. Patient is agreeable to call 911 or go to the nearest ER should he/she begin having SI/HI.     Barriers:    [] Co-Occurring Conditions [] Medically Complex  [] Financial    [] Transportation Issues [] Low Support  [] Lack of knowledge/experience with electronic communication devices/electronic medical record  Strengths:   [] Motivated    [] Supportive friends/family [] Knowledge of disease  [] Sona/Denominational   [] Overall good health [] Pets      Short-Term Goals: Patient will be compliant with medication management and note improvement in symptoms over the next 4 to 6 weeks or at next scheduled visit.  Long-Term Goals: Patient will continue psychotherapy as well as medication regimen without impairment in daily functioning over the next 6 months.      Prognosis: Good with ongoing treatment    Follow Up   -Patient instructed to keep follow up appointments and notify office if cancellation/reschedule is needed.  -Patient was given instructions and counseling regarding condition being managed and health maintenance advice. Please see specific information pulled into the AVS if appropriate.     No diagnosis found.   No orders of the defined types were placed in this encounter.      Errors in dictation may reflect use of voice recognition software and not all errors in  transcription may have been detected prior to signing. Author states that some information has been carried over from previous notes/encounters, information has been reviewed and updated.

## 2022-03-18 PROBLEM — R32 INCONTINENCE: Status: ACTIVE | Noted: 2021-04-23

## 2022-03-18 PROBLEM — F41.1 GENERALIZED ANXIETY DISORDER: Status: ACTIVE | Noted: 2022-03-18

## 2022-03-18 PROBLEM — E44.1: Status: ACTIVE | Noted: 2020-05-17

## 2022-03-18 PROBLEM — F33.1 MDD (MAJOR DEPRESSIVE DISORDER), RECURRENT EPISODE, MODERATE: Status: ACTIVE | Noted: 2022-03-18

## 2022-03-18 RX ORDER — HYDROCODONE BITARTRATE AND ACETAMINOPHEN 7.5; 325 MG/1; MG/1
TABLET ORAL
COMMUNITY
Start: 2022-02-13 | End: 2022-03-21

## 2022-03-18 RX ORDER — TAMSULOSIN HYDROCHLORIDE 0.4 MG/1
1 CAPSULE ORAL DAILY
COMMUNITY
Start: 2022-01-21 | End: 2022-03-21

## 2022-03-18 RX ORDER — BACLOFEN 20 MG/1
TABLET ORAL
COMMUNITY
Start: 2022-02-13 | End: 2022-07-06

## 2022-03-18 NOTE — ASSESSMENT & PLAN NOTE
S/S of condition Identified. Illness is currently active and described as moderate in severity. S/S impair ability function in day-to-day activities or cause undo distress. Pt current condition is unlikely to improve without continued treatment/monitoring, treatment consists of pharmacological and non-pharmacological interventions, changes to treatment agreed upon by pt and ARNP, will re-evaluate response at next scheduled apt.

## 2022-03-18 NOTE — PROGRESS NOTES
[facility]  [facility]  [address]  [facility] [address] [phone]   Initial Behavioral Health Note  Subjective   Brian Osorio is a 26 y.o. male who presents for initial evaluation  Pt accompanied by Shinto     Chief Complaint:  ANXIETY AND CHRONIC ILLNESS     HPI:  Pt speaks Latvian and english,  here to aid communication.  Patient reports signs and symptoms of depression and anxiety that first became apparent 4 years ago, patient reports that his health began to decline, after years of different treatments and seeing different doctors no medical cause determined, patient awaiting genetic testing.  Symptoms include incontinence and dysphagia, patient reports there is some type of damage to his bone marrow and/or spinal cord, patient reports he had surgery in November on his bone marrow, patient did not get better afterwards.  Patient works part-time, medical symptoms getting in the way to work.  Patient reports sleeping very well, denies major changes in weight or appetite.  Patient reports he is tried several meds in the past, difficult to communicate at times, patient reports Klonopin is the only thing that helps him.  When asked what symptoms Klonopin improves, patient reports he is able to move his neck and chin better and there is less tightness in his neck and chest, suggesting it helps with physical symptoms of anxiety.  Explained to patient that I am limited to what I can prescribe due to lack of a [employer] license, I will forward my note with explanation to PCP, patient agreeable to try a daily medication to control anxiety, risk/benefit discussed, patient verbalized understanding.  Patient denies history of substance abuse or issues with drinking alcohol.  Patient reports he does not feel depressed currently, patient denies SI/HI verbally.    Prior Psychiatric Medication Trials:  · Klonopin 1  mg  · Celexa  · Xanax  · Zoloft  · Buspar  · Elavil     Psychiatric History:  Past Diagnosis: Anxiety     Prior Treatments: Medications    Past Provider: denies    Psych Hospitalizations: denies    Suicide Attempts: denies    Violence/Legal Issues: denies    Abuse/Trauma: denies    Psychiatric Review Of Systems:    Depression:   [x] No S/S reported   [] Pt reports S/S that are consistent with a depressive episode and include:  [] Low mood daily for all or most of day for > 2 weeks  [] Sleep changes  [] Initiation  [] Maintenance [] Hypersomnia  [] Anhedonia/Diminished Interest  [] Guilt  [x] Low energy  [x] Diminished Concentration  [] Appetite Changes []  Increased []  Decreased  [] Wt Changes  [] Psychomotor changes []  Slowing / Retardation []  Increased / Agitation  [] Suicidal ideation    Anxiety:   []  No S/S reported  [x]  S/S Occur nearly every day and > 6 month in duration  [x]  Excessive Worry       [x]  Restless/edgy [x] Easily fatigued  [x]Muscle tension  []  Poor sleep/insomnia []  Decreased concentration    Panic:  [] No S/S reported  [x] Pt reports S/S that are consistent with a panic attacks, symptoms included:   [x] Shortness of breath [x] Chest tightness   [x] Feelings of dread  [] Sweating    [] Hyperventilating  [x] Avoidance behavior    PTSD:   [x] No S/S reported  [] Pt reports S/S that are consistent with a panic attacks, symptoms included:       [] Trauma/Event experienced/witness  [] Persistent re-experiencing       [] Dreams/Nightmares   [] Flashbacks       [] Avoidance behavior   [] Hyper-arousal       [] Increased Vigilance   [] Easily startled    Bipolar:   [x] No S/S reported  [] S/S of zoe reported and have lasted > consecutive 7 days  [] Increased energy  [] Increase in goal directed activity [] Impulsivity                [] Decreased need for sleep [] Grandiosity  [] Talkative  [] Risk taking behavior [] Pressured speech [] Flight of ideas    Psychosis:   [x] No S/S reported  []  Hallucinations       [] Auditory  []  Visual []  Tactile       [] Gustatory[]  Olfactory  [] Overt Delusions:       []  Paranoia []  Persecution []  Somatic       []  Self-referential []  Thought blocking  []  Thought insertion       []  Thought withdrawal [] Disorganized speech/behavior    Substance Abuse:  [x] No S/S reported  Eating D/O: [x] No S/S reported  Personality D/O: [x] No S/S reported  OCD:  [x] No S/S reported    The following portions of the patient's history were reviewed and updated as appropriate: allergies, current medications, past family history, past medical history, past surgical history and problem list.    Patient Active Problem List   Diagnosis   • Urinary frequency   • Bilateral low back pain without sciatica   • Fatty liver   • Gastroparesis   • Villa-Danlos disease   • Spasm   • Low vitamin D level   • Wellness examination   • Abdominal discomfort   • Atypical chest pain   • Abnormal liver function tests   • Malnutrition of mild degree (Osorio: 75% to less than 90% of standard weight) (HCC)   • Incontinence   • MDD (major depressive disorder), recurrent episode, moderate (HCC)   • Generalized anxiety disorder with panic attacks   • Overflow incontinence       Past Medical History:   Diagnosis Date   • Anxiety    • Chronic pain disorder    • Depression    • Panic disorder      Past Medical History Pertinent Negatives:   Diagnosis Date Noted   • ADHD (attention deficit hyperactivity disorder) 03/21/2022     Surgical History   has a past surgical history that includes Appendectomy; Abdominal surgery; and Back surgery.   Family History  family history is not on file.  Social History     Social History Narrative   • Not on file      Social History     Tobacco Use   • Smoking status: Never Smoker   • Smokeless tobacco: Never Used   Substance Use Topics   • Alcohol use: Never   • Drug use: Never        Review of Systems   Constitutional: Negative for chills and fever.   Respiratory: Negative  "for chest tightness and shortness of breath.    Cardiovascular: Negative for chest pain and palpitations.   Musculoskeletal: Negative for gait problem.   Psychiatric/Behavioral: Negative for self-injury and suicidal ideas.   Review of Systems    Objective     Physical Exam  Constitutional:       Appearance: Normal appearance, clothing appropriate for age, no acute distress  Musculoskeletal:         General: No problems with ambulation reported  Facial Expression:      Speech: Normal clear concise, no slurring or tics observed.  Respiratory      Breaths appear even and unlabored, no cough observed       Vitals  Blood pressure 120/62, pulse 79, resp. rate 16, height 177.8 cm (70\"), weight 72.1 kg (159 lb), SpO2 98 %.  Body mass index is 22.81 kg/m².     Recent Results (from the past 2016 hour(s))   AUTODIFF    Collection Time: 02/12/22  3:22 PM    Specimen: Blood   Result Value Ref Range    Neutrophil % 57.3 34.0 - 69.5 %    Lymphocyte % 35.0 20.0 - 53.0 %    Monocyte % 6.7 5.0 - 12.5 %    Eosinophil % 0.7 0.7 - 6.0 %    Basophil % 0.3 0.0 - 3.0 %    Neutrophils Absolute 3.00 1.70 - 6.00 x10(3)/ul    Lymphocytes Absolute 1.8 0.8 - 3.2 x10(3)/ul    Monocytes Absolute 0.4 0.2 - 1.4 x10(3)/ul    Eosinophils Absolute 0.0 0.0 - 0.6 x10(3)/ul    Basophils Absolute 0.0 0.0 - 0.3 x10(3)/ul   Urinalysis With Culture If Indicated -    Collection Time: 02/12/22  3:24 PM    Specimen: Urine   Result Value Ref Range    Specimen Type: U CleanCatch     Color, UA YELLOW     Appearance, UA CLEAR Clear    Specific Gravity, UA 1.02 1.003 - 1.030    pH, UA 7 5.0 - 8.0    Leuk Esterase, UA NEGATIVE Negative    Nitrite, UA NEGATIVE Negative    Protein, UA NEGATIVE Negative    Glucose, UA NEGATIVE Negative    Ketones, UA NEGATIVE Negative    Urobilinogen, UA 0.2 0.1 - 1.0 EU/dL    Bilirubin, UA NEGATIVE Negative    Blood, UA TRACE (A) Negative     Common labs    Common Labsle 11/18/21 11/18/21 11/18/21    0832 0832 0832   Glucose  91    BUN  " 15    Creatinine  0.92    eGFR Non  Am  116    eGFR African Am  134    Sodium  143    Potassium  4.4    Chloride  102    Calcium  9.7    Total Protein  7.4    Albumin  5.0    Total Bilirubin  0.4    Alkaline Phosphatase  59    AST (SGOT)  33    ALT (SGPT)  112 (A)    WBC 7.1     Hemoglobin 14.8     Hematocrit 46.1     Platelets 341     Total Cholesterol   180   Triglycerides   176 (A)   HDL Cholesterol   46   LDL Cholesterol    103 (A)   (A) Abnormal value       Comments are available for some flowsheets but are not being displayed.             Allergies  Allergies   Allergen Reactions   • Azithromycin Other (See Comments)     weakness   • Dexlansoprazole Itching      Medications  Current Outpatient Medications   Medication Sig Dispense Refill   • baclofen (LIORESAL) 20 MG tablet TAKE 1 TABLET BY MOUTH THREE TIMES DAILY AS NEEDED FOR MUSCLE SPASMS OR PAIN     • clonazePAM (KlonoPIN) 1 MG disintegrating tablet      • desvenlafaxine (PRISTIQ) 50 MG 24 hr tablet Take 1 tablet by mouth Daily. 30 tablet 1   • phenazopyridine (PYRIDIUM) 100 MG tablet Take 100 mg by mouth 3 (Three) Times a Day As Needed.       No current facility-administered medications for this visit.     Mental Status Exam:   Hygiene:   good  Cooperation:  Cooperative  Eye Contact:  Good  Psychomotor Behavior:  Appropriate  Affect:  sad  Hopelessness: Denies  Speech:  Normal  Thought Process:  Goal directed  Thought Content:  Normal  Suicidal:  None  Homicidal:  None  Hallucinations:  denies  Delusion:  denies  Memory:  Intact  Orientation:  Person, Place, Time and Situation  Reliability:  fair  Insight:  Fair  Judgement:  intact  Impulse Control:  Fair  Physical/Medical Issues:  Yes Reviewed    Assessment/Plan     Problem List Items Addressed This Visit        Psychiatry Problems    Generalized anxiety disorder with panic attacks - Primary (Chronic)    Current Assessment & Plan     S/S of condition Identified. Illness is currently active and  described as moderate in severity. S/S impair ability function in day-to-day activities or cause undo distress. Pt current condition is unlikely to improve without continued treatment/monitoring, treatment consists of pharmacological and non-pharmacological interventions, changes to treatment agreed upon by pt and PAMELA, will re-evaluate response at next scheduled apt.      1. OK to start Pristiq/Desvenlafaxine 50 mg, take (1) a day related to anxiety  2. Monitor for side effects/improvement report to [facility] immediately  3. Will sent chart to primary care doctorPAMELA unable to write klonopig till summer due to lack of [employer] license  4. Follow Up in (4) weeks with [facility]  5. Referral sent for Qatari speaking therapist  6. Increase activites of daily living and exercise as tolerated           Relevant Medications    desvenlafaxine (PRISTIQ) 50 MG 24 hr tablet         A thorough discussion was had that included review of disease process, need for continued monitoring and additional treatment options including use of pharmacological and non-pharmacological approaches to care, decisions were made and agreed upon by patient and provider. Discussed the risks, benefits, and potential side effects of the medications; patient ackowledged and verbally consented. Patient is advised to avoid driving or operating heavy machinery if they feel drowsy or over sedated. Patient is agreeable to call the office with any worsening of symptoms or onset of intolerable side effects. Patient is agreeable to call 911 or go to the nearest ER should he/she begin having SI/HI.     Barriers:    [x] Co-Occurring Conditions [x] Medically Complex  [] Financial    [] Transportation Issues [] Low Support  [] Lack of knowledge/experience with electronic communication devices/electronic medical record  Strengths:   [x] Motivated    [] Supportive friends/family [] Knowledge of disease  [] Sona/Mosque   [] Overall good health [] Pets      Short-Term  Goals: Patient will be compliant with medication management and note improvement in symptoms over the next 4 to 6 weeks or at next scheduled visit.  Long-Term Goals: Patient will continue psychotherapy as well as medication regimen without impairment in daily functioning over the next 6 months.      Prognosis: Good with ongoing treatment    Follow Up   -Patient instructed to keep follow up appointments and notify office if cancellation/reschedule is needed.  -Patient was given instructions and counseling regarding condition being managed and health maintenance advice. Please see specific information pulled into the AVS if appropriate.       ICD-10-CM ICD-9-CM   1. Generalized anxiety disorder  F41.1 300.02      New Medications Ordered This Visit   Medications   • desvenlafaxine (PRISTIQ) 50 MG 24 hr tablet     Sig: Take 1 tablet by mouth Daily.     Dispense:  30 tablet     Refill:  1       Errors in dictation may reflect use of voice recognition software and not all errors in transcription may have been detected prior to signing. Author states that some information has been carried over from previous notes/encounters, information has been reviewed and updated.

## 2022-03-18 NOTE — PATIENT INSTRUCTIONS
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    Today we discussed the risks, benefits, and potential side effects of the medications; patient acknowledged and verbally consented. Patient is advised to avoid driving or operating heavy machinery if they feel drowsy or over sedated. Patient is agreeable to call the office with any worsening of symptoms or onset of intolerable side effects. Patient is agreeable to call 911 or go to the nearest ER should he/she begin having suicidal/homicidal thoughts.

## 2022-03-18 NOTE — ASSESSMENT & PLAN NOTE
S/S of condition Identified. Illness is currently active and described as moderate in severity. S/S impair ability function in day-to-day activities or cause undo distress. Pt current condition is unlikely to improve without continued treatment/monitoring, treatment consists of pharmacological and non-pharmacological interventions, changes to treatment agreed upon by pt and ARNPAL, will re-evaluate response at next scheduled apt.      1. OK to start Pristiq/Desvenlafaxine 50 mg, take (1) a day related to anxiety  2. Monitor for side effects/improvement report to [facility] immediately  3. Will sent chart to primary care doctor, PAMELA Stone unable to write klonopig till summer due to lack of [employer] license  4. Follow Up in (4) weeks with [facility]  5. Referral sent for Angolan speaking therapist  6. Increase activites of daily living and exercise as tolerated

## 2022-03-21 RX ORDER — DEXTROMETHORPHAN HYDROBROMIDE AND PROMETHAZINE HYDROCHLORIDE 15; 6.25 MG/5ML; MG/5ML
SYRUP ORAL
COMMUNITY
Start: 2021-12-23 | End: 2022-03-22

## 2022-03-21 RX ORDER — CLONAZEPAM 1 MG/1
TABLET, ORALLY DISINTEGRATING ORAL
COMMUNITY
End: 2022-03-22

## 2022-03-21 RX ORDER — DEXAMETHASONE 2 MG/1
TABLET ORAL
COMMUNITY
Start: 2021-12-23 | End: 2022-03-22

## 2022-03-21 RX ORDER — PHENAZOPYRIDINE HYDROCHLORIDE 100 MG/1
100 TABLET, FILM COATED ORAL 3 TIMES DAILY PRN
COMMUNITY
Start: 2021-12-17 | End: 2022-03-25

## 2022-03-22 ENCOUNTER — OFFICE VISIT (OUTPATIENT)
Dept: BEHAVIORAL HEALTH | Facility: CLINIC | Age: 27
End: 2022-03-22

## 2022-03-22 VITALS
HEART RATE: 79 BPM | OXYGEN SATURATION: 98 % | WEIGHT: 159 LBS | HEIGHT: 70 IN | SYSTOLIC BLOOD PRESSURE: 120 MMHG | RESPIRATION RATE: 16 BRPM | DIASTOLIC BLOOD PRESSURE: 62 MMHG | BODY MASS INDEX: 22.76 KG/M2

## 2022-03-22 DIAGNOSIS — F41.1 GENERALIZED ANXIETY DISORDER: Primary | ICD-10-CM

## 2022-03-22 PROBLEM — N40.1 BENIGN PROSTATIC HYPERPLASIA WITH LOWER URINARY TRACT SYMPTOMS: Status: ACTIVE | Noted: 2022-02-03

## 2022-03-22 PROBLEM — F41.0 GENERALIZED ANXIETY DISORDER WITH PANIC ATTACKS: Chronic | Status: ACTIVE | Noted: 2022-03-18

## 2022-03-22 PROBLEM — N39.490 OVERFLOW INCONTINENCE: Status: ACTIVE | Noted: 2022-02-03

## 2022-03-22 PROCEDURE — 90792 PSYCH DIAG EVAL W/MED SRVCS: CPT

## 2022-03-22 RX ORDER — DESVENLAFAXINE SUCCINATE 50 MG/1
50 TABLET, EXTENDED RELEASE ORAL DAILY
Qty: 30 TABLET | Refills: 1 | Status: SHIPPED | OUTPATIENT
Start: 2022-03-22 | End: 2022-05-03

## 2022-03-22 NOTE — PROGRESS NOTES
Problem List Items Addressed This Visit        Psychiatry Problems    Generalized anxiety disorder with panic attacks - Primary (Chronic)    Current Assessment & Plan     S/S of condition Identified. Illness is currently active and described as moderate in severity. S/S impair ability function in day-to-day activities or cause undo distress. Pt current condition is unlikely to improve without continued treatment/monitoring, treatment consists of pharmacological and non-pharmacological interventions, changes to treatment agreed upon by pt and ARNP, will re-evaluate response at next scheduled apt.      1. OK to start Pristiq/Desvenlafaxine 50 mg, take (1) a day related to anxiety  2. Monitor for side effects/improvement report to [facility] immediately  3. Will sent chart to primary care doctor, PAMELA Stone unable to write klonopig till summer due to lack of [employer] license  4. Follow Up in (4) weeks with [facility]  5. Referral sent for [place] speaking therapist  6. Increase activites of daily living and exercise as tolerated           Relevant Medications    desvenlafaxine (PRISTIQ) 50 MG 24 hr tablet        Told patient that PCP might be OK with continuing klonopin for time being but it was up to him, if not pt will need referral for another mental health provider PAMELA Stone unable to prescribe narcotics for time being.  Review of documents in epic, PAMELA Stone unable to find recent prescription for Klonopin or doctor that wrote it, last documentation of medication was from 2021.  Review of records difficult, patient has been seen by multiple doctors from multiple different practices over past several years, attempting to add providers to care team to facilitate communication, need to find current neurologist, urologist, , neurologist patient went and saw in [place], past psychiatrist, past physical therapist.   Returned call  Pt states, someone with the Smoking Cessation Program contacted and was told she does not meet the criteria. Informed pt Dr Avendano is not in clinic will return back to clinic in mid Dec and upon his return this matter will be addressed. Pt voiced an understanding

## 2022-03-25 ENCOUNTER — OFFICE VISIT (OUTPATIENT)
Dept: FAMILY MEDICINE CLINIC | Facility: CLINIC | Age: 27
End: 2022-03-25

## 2022-03-25 VITALS
DIASTOLIC BLOOD PRESSURE: 76 MMHG | WEIGHT: 160 LBS | OXYGEN SATURATION: 99 % | SYSTOLIC BLOOD PRESSURE: 124 MMHG | TEMPERATURE: 98.4 F | HEART RATE: 79 BPM | BODY MASS INDEX: 22.4 KG/M2 | HEIGHT: 71 IN

## 2022-03-25 DIAGNOSIS — R74.01 ALT (SGPT) LEVEL RAISED: ICD-10-CM

## 2022-03-25 DIAGNOSIS — F41.9 ANXIETY: Primary | ICD-10-CM

## 2022-03-25 DIAGNOSIS — Z79.899 HIGH RISK MEDICATION USE: ICD-10-CM

## 2022-03-25 DIAGNOSIS — E78.00 HIGH CHOLESTEROL: ICD-10-CM

## 2022-03-25 DIAGNOSIS — R79.89 LOW VITAMIN D LEVEL: ICD-10-CM

## 2022-03-25 PROCEDURE — 99214 OFFICE O/P EST MOD 30 MIN: CPT | Performed by: FAMILY MEDICINE

## 2022-03-25 RX ORDER — CLONAZEPAM 1 MG/1
1 TABLET ORAL 2 TIMES DAILY PRN
COMMUNITY
End: 2022-03-25 | Stop reason: SDUPTHER

## 2022-03-25 RX ORDER — CLONAZEPAM 1 MG/1
1 TABLET ORAL 2 TIMES DAILY PRN
Qty: 60 TABLET | Refills: 0 | Status: SHIPPED | OUTPATIENT
Start: 2022-03-25 | End: 2022-07-06

## 2022-03-25 NOTE — PROGRESS NOTES
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 No follow-up required.      Physical Exam  Vitals and nursing note reviewed.   Constitutional:       Appearance: He is well-developed.   HENT:      Head: Normocephalic and atraumatic.      Right Ear: External ear normal.      Left Ear: External ear normal.      Nose: Nose normal.   Eyes:      General: No scleral icterus.        Right eye: No discharge.         Left eye: No discharge.      Conjunctiva/sclera: Conjunctivae normal.      Pupils: Pupils are equal, round, and reactive to light.   Neck:      Thyroid: No thyromegaly.      Vascular: No JVD.      Trachea: No tracheal deviation.   Cardiovascular:      Rate and Rhythm: Normal rate and regular rhythm.      Heart sounds: Normal heart sounds. No murmur heard.    No friction rub. No gallop.   Pulmonary:      Effort: Pulmonary effort is normal. No respiratory distress.      Breath sounds: Normal breath sounds. No wheezing or rales.   Chest:      Chest wall: No tenderness.   Abdominal:      General: Bowel sounds are normal. There is no distension.      Palpations: Abdomen is soft. There is no mass.      Tenderness: There is no abdominal tenderness. There is no guarding " or rebound.      Hernia: No hernia is present.   Musculoskeletal:         General: No tenderness. Normal range of motion.      Cervical back: Normal range of motion and neck supple.   Lymphadenopathy:      Cervical: No cervical adenopathy.   Skin:     General: Skin is warm and dry.   Neurological:      General: No focal deficit present.      Mental Status: He is alert.      Cranial Nerves: No cranial nerve deficit.      Sensory: No sensory deficit.      Motor: No abnormal muscle tone.      Coordination: Coordination normal.      Deep Tendon Reflexes: Reflexes normal.   Psychiatric:         Mood and Affect: Mood normal.         Behavior: Behavior normal.         Thought Content: Thought content normal.         Judgment: Judgment normal.        Result Review :                 Assessment and Plan    Diagnoses and all orders for this visit:    1. Anxiety (Primary)  -     clonazePAM (KlonoPIN) 1 MG tablet; Take 1 tablet by mouth 2 (Two) Times a Day As Needed for Anxiety.  Dispense: 60 tablet; Refill: 0    2. High risk medication use  -     Compliance Drug Analysis, Ur - Urine, Clean Catch    3. ALT (SGPT) level raised  -     Comprehensive Metabolic Panel; Future  -     Lipid Panel; Future  -     Vitamin D 25 Hydroxy; Future    4. Low vitamin D level  -     Comprehensive Metabolic Panel; Future  -     Lipid Panel; Future  -     Vitamin D 25 Hydroxy; Future    5. High cholesterol  -     Comprehensive Metabolic Panel; Future  -     Lipid Panel; Future  -     Vitamin D 25 Hydroxy; Future        Follow Up   Return in about 3 months (around 6/25/2022).  Patient was given instructions and counseling regarding his condition or for health maintenance advice. Please see specific information pulled into the AVS if appropriate.   Side effects discussed with patient in detail, all including but not limited to every single topic discussed

## 2022-04-01 DIAGNOSIS — E78.00 HIGH CHOLESTEROL: ICD-10-CM

## 2022-04-01 DIAGNOSIS — R79.89 LOW VITAMIN D LEVEL: ICD-10-CM

## 2022-04-01 DIAGNOSIS — R74.01 ALT (SGPT) LEVEL RAISED: ICD-10-CM

## 2022-04-01 LAB — DRUGS UR: NORMAL

## 2022-04-02 LAB
25(OH)D3+25(OH)D2 SERPL-MCNC: 37.7 NG/ML (ref 30–100)
ALBUMIN SERPL-MCNC: 5.2 G/DL (ref 4.1–5.2)
ALBUMIN/GLOB SERPL: 2.5 {RATIO} (ref 1.2–2.2)
ALP SERPL-CCNC: 60 IU/L (ref 44–121)
ALT SERPL-CCNC: 43 IU/L (ref 0–44)
AST SERPL-CCNC: 19 IU/L (ref 0–40)
BILIRUB SERPL-MCNC: 0.8 MG/DL (ref 0–1.2)
BUN SERPL-MCNC: 13 MG/DL (ref 6–20)
BUN/CREAT SERPL: 13 (ref 9–20)
CALCIUM SERPL-MCNC: 9.9 MG/DL (ref 8.7–10.2)
CHLORIDE SERPL-SCNC: 101 MMOL/L (ref 96–106)
CHOLEST SERPL-MCNC: 157 MG/DL (ref 100–199)
CO2 SERPL-SCNC: 26 MMOL/L (ref 20–29)
CREAT SERPL-MCNC: 1.03 MG/DL (ref 0.76–1.27)
EGFRCR SERPLBLD CKD-EPI 2021: 103 ML/MIN/1.73
GLOBULIN SER CALC-MCNC: 2.1 G/DL (ref 1.5–4.5)
GLUCOSE SERPL-MCNC: 93 MG/DL (ref 65–99)
HDLC SERPL-MCNC: 46 MG/DL
LDLC SERPL CALC-MCNC: 94 MG/DL (ref 0–99)
POTASSIUM SERPL-SCNC: 4 MMOL/L (ref 3.5–5.2)
PROT SERPL-MCNC: 7.3 G/DL (ref 6–8.5)
SODIUM SERPL-SCNC: 143 MMOL/L (ref 134–144)
TRIGL SERPL-MCNC: 91 MG/DL (ref 0–149)
VLDLC SERPL CALC-MCNC: 17 MG/DL (ref 5–40)

## 2022-05-03 ENCOUNTER — OFFICE VISIT (OUTPATIENT)
Dept: BEHAVIORAL HEALTH | Facility: CLINIC | Age: 27
End: 2022-05-03

## 2022-05-03 VITALS
WEIGHT: 152 LBS | HEART RATE: 75 BPM | DIASTOLIC BLOOD PRESSURE: 76 MMHG | BODY MASS INDEX: 21.28 KG/M2 | OXYGEN SATURATION: 98 % | HEIGHT: 71 IN | RESPIRATION RATE: 14 BRPM | SYSTOLIC BLOOD PRESSURE: 118 MMHG

## 2022-05-03 DIAGNOSIS — F41.1 GENERALIZED ANXIETY DISORDER WITH PANIC ATTACKS: Primary | ICD-10-CM

## 2022-05-03 DIAGNOSIS — F41.0 GENERALIZED ANXIETY DISORDER WITH PANIC ATTACKS: Primary | ICD-10-CM

## 2022-05-03 PROCEDURE — 99214 OFFICE O/P EST MOD 30 MIN: CPT

## 2022-05-03 RX ORDER — MIRABEGRON 50 MG/1
50 TABLET, FILM COATED, EXTENDED RELEASE ORAL DAILY
COMMUNITY
Start: 2022-04-25

## 2022-05-03 NOTE — PROGRESS NOTES
[facility]  [facility]  [address]  [facility] [address] [phone]     Behavioral Health Note  Subjective   Brian Osorio is a 26 y.o. male who presents today for follow up     Chief Complaint:  ANXIETY  Pt accompanied by     HPI:   Established patient of Chase Matias ARNP, seen today for (late) follow up. Patient last seen 3/22/22. Since that time patient reports S/S have remained stable with with prescribed treatment, klonopin written by pts primary care provder Per pt “the pristiq was making me tired and upsetting my stomach so I stopped it after a few days”. Anxiety is relatively stable with klonopin, pt reports chronic pain continus, multiple doctors involved in his care, no cause of symptoms yet determined, upcoming appointment with genetic MD. Communication hindered by need to use a third party, pt interested in medical marijuana, pt interested in OTC CBD oil, risks/benefits of both discussed, legality in [place] discussed. Pt worried about CBD having THC in it which is illegal in [place], pt educated the ARNP doesn't treat chronic pain, medical THC in the past used for pain associated with cancer and epilepsy. Pt convincingly denies thoughts of SI/HI [id number]. Pt endorses sleep disturbance, rates sleep as OK for now. Patient currently denies recent illness, new medical problems or new medications since last visit. Patient educated on need for daily treatment compliance and continued monitoring.  Overview of medications conducted, risks/benefits discussed as well as potential side effects, pt verbalized understanding. It was agreed upon by provider and patient to continue current medications (d/c pristiq), monitor for side effects/improvement and reassess at next follow up appointment. Pt agreeable to reach out to provider for new/worsening symptoms or change in status.     Pt educated on ARNP inability to write klonopin currently,  different treatments tried in the past and discontinued due to lack of benefit or side effects, ARNP will have [employer] in around 2 months time to potentially continue medication, risks/benefit of benzo's discussed, pt asking for note to be sent to PCP.    Specialty Problems        Psychiatry Problems    Generalized anxiety disorder with panic attacks        MDD (major depressive disorder), recurrent episode, moderate (HCC)             The following portions of the patient's history were reviewed and updated as appropriate: allergies, current medications, past family history, past medical history, past surgical history and problem list.    Patient Active Problem List   Diagnosis   • Urinary frequency   • Bilateral low back pain without sciatica   • Fatty liver   • Gastroparesis   • Villa-Danlos disease   • Spasm   • Low vitamin D level   • Wellness examination   • Abdominal discomfort   • Atypical chest pain   • Abnormal liver function tests   • Malnutrition of mild degree (Osorio: 75% to less than 90% of standard weight) (HCC)   • Incontinence   • MDD (major depressive disorder), recurrent episode, moderate (HCC)   • Generalized anxiety disorder with panic attacks   • Overflow incontinence   • Benign prostatic hyperplasia with lower urinary tract symptoms   • High risk medication use   • ALT (SGPT) level raised   • High cholesterol   • Anxiety     Medical History    Past Medical History:   Diagnosis Date   • Anxiety    • Chronic pain disorder    • Depression    • Liver disease    • Low back pain    • Neuromuscular disorder (HCC)    • Panic disorder        Past Medical History Pertinent Negatives:   Diagnosis Date Noted   • ADHD (attention deficit hyperactivity disorder) 03/21/2022   • Alcohol abuse 05/03/2022   • Alcoholism ([facility]) 03/23/2022   • Anorexia nervosa 05/03/2022   • Bipolar disorder ([facility]) 03/23/2022   • Disease of thyroid gland 03/23/2022   • Head injury 05/03/2022   • HIV disease ([facility]) 05/03/2022   • PTSD  "(post-traumatic stress disorder) 05/03/2022   • Schizoaffective disorder (HCC) 05/03/2022   • Self-injurious behavior 05/03/2022   • Suicide attempt (HCC) 03/23/2022     Surgical History   has a past surgical history that includes Appendectomy; Abdominal surgery; Back surgery; Bladder surgery (N/A, 03/02/2022); and Spine surgery.   Family History  family history is not on file.  Social History  Social History     Social History Narrative    Patient is a 26-year-old male, lives at home with mother, not employed currently, Czech-speaking  needed at medical appointments to facilitate communication, patient is able to understand verbal and written English      Social History     Tobacco Use   • Smoking status: Never Smoker   • Smokeless tobacco: Never Used   Substance Use Topics   • Alcohol use: Never   • Drug use: Never      Review of Systems   Constitutional: Negative.    Respiratory: Negative for chest tightness and shortness of breath.   Cardiovascular: Negative for chest pain.   Gastrointestinal: Negative for nausea and vomiting.   Musculoskeletal: Negative for gait problem.     Objective   Physical Exam  Constitutional:       Appearance: Normal appearance, clothing appropriate for age, appears in no acute distress  Musculoskeletal:         General: No problems with ambulation reported   Facial Expression:      Speech: Normal clear concise, no slurring or vocal tics observed.  Respiratory      Breaths appear even and unlabored, no cough observed.       Vitals  Blood pressure 118/76, pulse 75, resp. rate 14, height 180.3 cm (71\"), weight 68.9 kg (152 lb), SpO2 98 %.  Body mass index is 21.2 kg/m².   Labs/Results  Recent Results (from the past 2016 hour(s))   AUTODIFF    Collection Time: 02/12/22  3:22 PM    Specimen: Blood   Result Value Ref Range    Neutrophil % 57.3 34.0 - 69.5 %    Lymphocyte % 35.0 20.0 - 53.0 %    Monocyte % 6.7 5.0 - 12.5 %    Eosinophil % 0.7 0.7 - 6.0 %    Basophil % 0.3 0.0 - " 3.0 %    Neutrophils Absolute 3.00 1.70 - 6.00 x10(3)/ul    Lymphocytes Absolute 1.8 0.8 - 3.2 x10(3)/ul    Monocytes Absolute 0.4 0.2 - 1.4 x10(3)/ul    Eosinophils Absolute 0.0 0.0 - 0.6 x10(3)/ul    Basophils Absolute 0.0 0.0 - 0.3 x10(3)/ul   Urinalysis With Culture If Indicated -    Collection Time: 02/12/22  3:24 PM    Specimen: Urine   Result Value Ref Range    Specimen Type: U CleanCatch     Color, UA YELLOW     Appearance, UA CLEAR Clear    Specific Gravity, UA 1.02 1.003 - 1.030    pH, UA 7 5.0 - 8.0    Leuk Esterase, UA NEGATIVE Negative    Nitrite, UA NEGATIVE Negative    Protein, UA NEGATIVE Negative    Glucose, UA NEGATIVE Negative    Ketones, UA NEGATIVE Negative    Urobilinogen, UA 0.2 0.1 - 1.0 EU/dL    Bilirubin, UA NEGATIVE Negative    Blood, UA TRACE (A) Negative   Compliance Drug Analysis, Ur - Urine, Clean Catch    Collection Time: 03/25/22  1:59 PM    Specimen: Urine, Clean Catch   Result Value Ref Range    Report Summary FINAL    Vitamin D 25 Hydroxy    Collection Time: 04/01/22 10:16 AM    Specimen: Blood   Result Value Ref Range    25 Hydroxy, Vitamin D 37.7 30.0 - 100.0 ng/mL   Lipid Panel    Collection Time: 04/01/22 10:16 AM    Specimen: Blood   Result Value Ref Range    Total Cholesterol 157 100 - 199 mg/dL    Triglycerides 91 0 - 149 mg/dL    HDL Cholesterol 46 >39 mg/dL    VLDL Cholesterol [place] 17 5 - 40 mg/dL    LDL Chol Calc (NIH) 94 0 - 99 mg/dL   Comprehensive Metabolic Panel    Collection Time: 04/01/22 10:16 AM    Specimen: Blood   Result Value Ref Range    Glucose 93 65 - 99 mg/dL    BUN 13 6 - 20 mg/dL    Creatinine 1.03 0.76 - 1.27 mg/dL    EGFR Result 103 >59 mL/min/1.73    BUN/Creatinine Ratio 13 9 - 20    Sodium 143 134 - 144 mmol/L    Potassium 4.0 3.5 - 5.2 mmol/L    Chloride 101 96 - 106 mmol/L    Total CO2 26 20 - 29 mmol/L    Calcium 9.9 8.7 - 10.2 mg/dL    Total Protein 7.3 6.0 - 8.5 g/dL    Albumin 5.2 4.1 - 5.2 g/dL    Globulin 2.1 1.5 - 4.5 g/dL    A/G Ratio 2.5  (H) 1.2 - 2.2    Total Bilirubin 0.8 0.0 - 1.2 mg/dL    Alkaline Phosphatase 60 44 - 121 IU/L    AST (SGOT) 19 0 - 40 IU/L    ALT (SGPT) 43 0 - 44 IU/L     Common labs    Common Labsle 11/18/21 11/18/21 11/18/21 4/1/22 4/1/22    0832 0832 0832 1016 1016   Glucose  91   93   BUN  15   13   Creatinine  0.92   1.03   eGFR Non  Am  116      eGFR African Am  134      Sodium  143   143   Potassium  4.4   4.0   Chloride  102   101   Calcium  9.7   9.9   Total Protein  7.4   7.3   Albumin  5.0   5.2   Total Bilirubin  0.4   0.8   Alkaline Phosphatase  59   60   AST (SGOT)  33   19   ALT (SGPT)  112 (A)   43   WBC 7.1       Hemoglobin 14.8       Hematocrit 46.1       Platelets 341       Total Cholesterol   180 157    Triglycerides   176 (A) 91    HDL Cholesterol   46 46    LDL Cholesterol    103 (A) 94    (A) Abnormal value       Comments are available for some flowsheets but are not being displayed.           Allergies  Allergies   Allergen Reactions   • Azithromycin Other (See Comments)     weakness      Current Outpatient Medications   Medication Sig Dispense Refill   • baclofen (LIORESAL) 20 MG tablet TAKE 1 TABLET BY MOUTH THREE TIMES DAILY AS NEEDED FOR MUSCLE SPASMS OR PAIN     • clonazePAM (KlonoPIN) 1 MG tablet Take 1 tablet by mouth 2 (Two) Times a Day As Needed for Anxiety. 60 tablet 0   • Myrbetriq 50 MG tablet sustained-release 24 hour 24 hr tablet Take 50 mg by mouth Daily.       No current facility-administered medications for this visit.     Mental Status Exam:   Hygiene:   good  Cooperation:  Cooperative  Eye Contact:  Good  Psychomotor Behavior:  Appropriate  Affect:  appropriate  Hopelessness: Denies  Speech:  Normal  Thought Process:  Goal directed  Thought Content:  Normal  Suicidal:  None  Homicidal:  None  Hallucinations:  None  Delusion:  None  Memory:  intact  Orientation:  Person, Place, Time and Situation  Reliability:  fair  Insight:  Fair  Judgement:  Good  Impulse Control:   Fair  Physical/Medical Issues:  Yes Reviewed    Assessment/Plan     Problem List Items Addressed This Visit        Psychiatry Problems    Generalized anxiety disorder with panic attacks - Primary (Chronic)         A thorough discussion was had that included review of disease process, need for continued monitoring and additional treatment options including use of pharmacological and non-pharmacological approaches to care, decisions were made and agreed upon by patient and provider. Discussed the risks, benefits, and potential side effects of the medications; patient ackowledged and verbally consented. Patient is advised to avoid driving or operating heavy machinery if they feel drowsy or over sedated. Patient is agreeable to call the office with any worsening of symptoms or onset of intolerable side effects. Patient is agreeable to call 911 or go to the nearest ER should he/she begin having SI/HI.     Barriers:    [] Co-Occurring Conditions [] Medically Complex  [] Financial    [] Transportation Issues [] Low Support   [x] Language   [] Lack of knowledge/experience with electronic communication devices/electronic medical record    Strengths:   [x] Motivated    [] Supportive friends/family  [] Knowledge of disease  [] Sona/Worship   [] Overall good health  [] Pets    Short-Term Goals: Patient will be compliant with medication management and note improvement in symptoms over the next 4 to 6 weeks or at next scheduled visit.  Long-Term Goals: Patient will continue psychotherapy as well as medication regimen without impairment in daily functioning over the next 6 months.      Progress towards goals:   [x] Minor [] Moderate [] Little to None [] States improvement  Impairment:    [x] Minor [] Moderate [] Significant  [] Severe   Prognosis:    Good with ongoing treatment    Follow Up   -Patient instructed to keep follow up appointments and notify office if cancellation/reschedule is needed.  -Patient was given  instructions and counseling regarding condition being managed and health maintenance advice. Please see specific information pulled into the AVS if appropriate.       ICD-10-CM ICD-9-CM   1. Generalized anxiety disorder with panic attacks  F41.1 300.02    F41.0 300.01      No orders of the defined types were placed in this encounter.      Errors in dictation may reflect use of voice recognition software and not all errors in transcription may have been detected prior to signing. Author states that some information has been carried over from previous notes/encounters, information has been reviewed and updated.

## 2022-05-03 NOTE — PATIENT INSTRUCTIONS
1.   Medication changes today: D/C Pristiq due to side effects (patient stopped at home). OK to continue klonopin as previously ordered r/t anxiety. Refills needed: NONE.  2.  Monitor for side effects/improvement report to ARNP immediately     3.  Follow-up with ARNP in 2 weeks, or sooner if needed.  4.  Check with primary care doctor about CBD oil    Increase positive coping skills as tolerated (light/moderate exercise, hobbies, art, music, mediation, yoga, journal, friends/family/pets, etc) to aid in overall health and help reduce stress.    Education: Please read/review attached documents, reach out with questions/concerns

## 2022-05-05 DIAGNOSIS — R79.89 LOW VITAMIN D LEVEL: ICD-10-CM

## 2022-05-05 RX ORDER — ERGOCALCIFEROL 1.25 MG/1
CAPSULE ORAL
Qty: 12 CAPSULE | Refills: 1 | OUTPATIENT
Start: 2022-05-05

## 2022-05-05 NOTE — TELEPHONE ENCOUNTER
Is vitamin d okay to fill for him? Please advise. Thank you.    Rx Refill Note  Requested Prescriptions     Pending Prescriptions Disp Refills   • vitamin D (ERGOCALCIFEROL) 1.25 MG (91234 UT) capsule capsule [Pharmacy Med Name: VITAMIN D2 50,000IU (ERGO) CAP RX] 12 capsule 1     Sig: TAKE 1 CAPSULE BY MOUTH EVERY 7 DAYS      Last office visit with prescribing clinician: 3/25/2022      Next office visit with prescribing clinician: 6/15/2022            Khari Sosa MA/LMR  05/05/22, 07:37 EDT

## 2022-05-12 ENCOUNTER — TELEPHONE (OUTPATIENT)
Dept: FAMILY MEDICINE CLINIC | Facility: CLINIC | Age: 27
End: 2022-05-12

## 2022-07-05 ENCOUNTER — OFFICE VISIT (OUTPATIENT)
Dept: BEHAVIORAL HEALTH | Facility: CLINIC | Age: 27
End: 2022-07-05

## 2022-07-05 VITALS
SYSTOLIC BLOOD PRESSURE: 122 MMHG | RESPIRATION RATE: 14 BRPM | WEIGHT: 152 LBS | HEART RATE: 79 BPM | OXYGEN SATURATION: 98 % | BODY MASS INDEX: 21.28 KG/M2 | HEIGHT: 71 IN | DIASTOLIC BLOOD PRESSURE: 64 MMHG

## 2022-07-05 DIAGNOSIS — F41.0 GENERALIZED ANXIETY DISORDER WITH PANIC ATTACKS: Primary | ICD-10-CM

## 2022-07-05 DIAGNOSIS — F41.1 GENERALIZED ANXIETY DISORDER WITH PANIC ATTACKS: Primary | ICD-10-CM

## 2022-07-05 PROCEDURE — 99214 OFFICE O/P EST MOD 30 MIN: CPT

## 2022-07-05 RX ORDER — MIRTAZAPINE 15 MG/1
15 TABLET, FILM COATED ORAL NIGHTLY
Qty: 30 TABLET | Refills: 0 | Status: SHIPPED | OUTPATIENT
Start: 2022-07-05 | End: 2022-08-04

## 2022-07-05 NOTE — PROGRESS NOTES
[facility]  [facility]  [address]  [facility] [address] [phone]     Behavioral Health Note  Subjective   Brian Osorio is a 26 y.o. male who presents today for follow up, patient accompanied by     HPI:   Established patient of Willi Matias that has been seen twice prior first time in March 2nd time in May, at last appointment Pristiq was DC'd due to intolerance to side effects, patient reports he only took it for a few days and it gave him GI upset, no other med changes were made at that time.     Lengthy story discussed about patient's quest to discover what is medically wrong with him, has consulted multiple doctors in and out of the [facility], recent testing through rheumatology revealed a potential autoimmune disorder, awaiting for a second opinion appointment in 2023.  Patient interested in coming off of the Klonopin, states he knows it is addictive and is worried it will kill him long-term, weaning process discussed, risk/benefits of additional anxiety meds including Remeron and antidepressants discussed.  It was agreed upon to try Remeron, continue Klonopin as previously ordered and reassess in 1 month.  Patient reports last time they try to take him off the Klonopin his medical symptoms would worsen, unknown etiology.    Patient was referred to a Czech-speaking therapist at last appointment, patient reports he only saw her once and did not follow through and they do not call him back either for a follow-up appointment, trauma like S/S reported that would do well with counseling.  Specialty Problems        Psychiatry Problems    Generalized anxiety disorder with panic attacks        MDD (major depressive disorder), recurrent episode, moderate (HCC)             The following portions of the patient's history were reviewed and updated as appropriate: allergies, current medications, past family history, past  medical history, past surgical history and problem list.    Patient Active Problem List   Diagnosis   • Urinary frequency   • Bilateral low back pain without sciatica   • Fatty liver   • Gastroparesis   • Villa-Danlos disease   • Spasm   • Low vitamin D level   • Wellness examination   • Abdominal discomfort   • Atypical chest pain   • Abnormal liver function tests   • Malnutrition of mild degree (Osorio: 75% to less than 90% of standard weight) (Formerly [facility])   • Incontinence   • MDD (major depressive disorder), recurrent episode, moderate (Formerly [facility])   • Generalized anxiety disorder with panic attacks   • Overflow incontinence   • Benign prostatic hyperplasia with lower urinary tract symptoms   • High risk medication use   • ALT (SGPT) level raised   • High cholesterol   • Anxiety     Medical History    Past Medical History:   Diagnosis Date   • Anxiety    • Chronic pain disorder    • Depression    • Liver disease    • Low back pain    • Neuromuscular disorder ([facility])    • Panic disorder        Past Medical History Pertinent Negatives:   Diagnosis Date Noted   • ADHD (attention deficit hyperactivity disorder) 03/21/2022   • Alcohol abuse 05/03/2022   • Alcoholism (Formerly [facility]) 03/23/2022   • Anorexia nervosa 05/03/2022   • Bipolar disorder (Former[facility]) 03/23/2022   • Disease of thyroid gland 03/23/2022   • Head injury 05/03/2022   • HIV disease ([facility]) 05/03/2022   • PTSD (post-traumatic stress disorder) 05/03/2022   • Schizoaffective disorder (Former[facility]) 05/03/2022   • Self-injurious behavior 05/03/2022   • Suicide attempt ([facility]) 03/23/2022     Surgical History   has a past surgical history that includes Appendectomy; Abdominal surgery; Back surgery; Bladder surgery (N/A, 03/02/2022); and Spine surgery.   Family History  family history is not on file.  Social History  Social History     Social History Narrative    Patient is a 26-year-old male, lives at home with mother, not employed currently, Mauritanian-speaking  needed at medical appointments  "to facilitate communication, patient is able to understand verbal and written English      Social History     Tobacco Use   • Smoking status: Never Smoker   • Smokeless tobacco: Never Used   Substance Use Topics   • Alcohol use: Never   • Drug use: Never      Review of Systems   Constitutional: Negative.    Respiratory: Negative.   Cardiovascular: Negative.  Gastrointestinal: Negative.  Musculoskeletal: Negative.    Objective   Physical Exam  Constitutional:       Appearance: Normal appearance, clothing appropriate for age, appears in no acute distress  Musculoskeletal:         General: No problems with ambulation reported   Facial Expression:      Speech: Normal clear concise, no slurring or vocal tics observed.  Respiratory      Breaths appear even and unlabored, no cough observed.    Cardiac      Normal rate, denies chest pain/shortness of breath      Vitals  Blood pressure 122/64, pulse 79, resp. rate 14, height 180.3 cm (71\"), weight 68.9 kg (152 lb), SpO2 98 %.  Body mass index is 21.2 kg/m².   Labs/Results  Recent Results (from the past 2016 hour(s))   CONV ANTINUCLEAR AB REFLEX CASCADE    Collection Time: 05/31/22  4:38 PM    Specimen type and source: Serum, Blood   Result Value Ref Range    ELIO POSITIVE (A) Negative (arb'U)   ELIO Titer    Collection Time: 05/31/22  4:38 PM    Specimen type and source: Serum,    Result Value Ref Range    ELIO Pattern Not Applicable (arb'U) (arb'U)    ELIO Titer 1 <1:80 <1:80 (arb'U)     Common labs    Common Labsle 11/18/21 11/18/21 11/18/21 4/1/22 4/1/22    0832 0832 0832 1016 1016   Glucose  91   93   BUN  15   13   Creatinine  0.92   1.03   eGFR Non  Am  116      eGFR African Am  134      Sodium  143   143   Potassium  4.4   4.0   Chloride  102   101   Calcium  9.7   9.9   Total Protein  7.4   7.3   Albumin  5.0   5.2   Total Bilirubin  0.4   0.8   Alkaline Phosphatase  59   60   AST (SGOT)  33   19   ALT (SGPT)  112 (A)   43   WBC 7.1       Hemoglobin 14.8     "   Hematocrit 46.1       Platelets 341       Total Cholesterol   180 157    Triglycerides   176 (A) 91    HDL Cholesterol   46 46    LDL Cholesterol    103 (A) 94    (A) Abnormal value       Comments are available for some flowsheets but are not being displayed.           Allergies  Allergies   Allergen Reactions   • Azithromycin Other (See Comments)     weakness      Current Outpatient Medications   Medication Sig Dispense Refill   • clonazePAM (KlonoPIN) 1 MG tablet Take 1 tablet by mouth 2 (Two) Times a Day As Needed for Anxiety. 60 tablet 0   • mirtazapine (Remeron) 15 MG tablet Take 1 tablet by mouth Every Night for 30 days. First 3 days take 1/2 tablet 30 tablet 0   • Myrbetriq 50 MG tablet sustained-release 24 hour 24 hr tablet Take 50 mg by mouth Daily.       No current facility-administered medications for this visit.     Mental Status Exam:   Hygiene:   good  Cooperation:  Cooperative  Eye Contact:  Good  Psychomotor Behavior:  Appropriate  Affect:  Appropriate  Hopelessness: Denies  Speech:  Normal  Thought Process:  Goal directed  Thought Content:  Normal  Suicidal:  None  Homicidal:  None  Hallucinations:  None  Delusion:  None  Memory:  Intact  Orientation:  Person, Place, Time and Situation  Reliability:  fair  Insight:  Fair  Judgement:  Good  Impulse Control:  Fair    Assessment & Plan     Problem List Items Addressed This Visit        Psychiatry Problems    Generalized anxiety disorder with panic attacks - Primary (Chronic)    Current Assessment & Plan     Psychological condition is unchanged.  Medication changes per orders.  Psychological condition  will be reassessed in 4 weeks.    Plan of Care:  1. Medication changes today: YES, OK to start Remeron/mirtazapine 15 mg/day, first 3 days take 1/2 tablet, Take at bedtime, be in bed within 30 minutes.  2. OK to continue klonopin as previously ordered.   3. Follow up in 4 weeks.            Relevant Medications    mirtazapine (Remeron) 15 MG tablet     clonazePAM (KlonoPIN) 1 MG tablet    Other Relevant Orders    Urine Drug Screen - Urine, Clean Catch         A thorough discussion was had that included review of disease process, need for continued monitoring and additional treatment options including use of pharmacological and non-pharmacological approaches to care, decisions were made and agreed upon by patient and provider. Discussed the risks, benefits, and potential side effects of the medications; patient ackowledged and verbally consented. Patient is advised to avoid driving or operating heavy machinery if they feel drowsy or over sedated. Patient is agreeable to call the office with any worsening of symptoms or onset of intolerable side effects. Patient is agreeable to call 911 or go to the nearest ER should he/she begin having SI/HI.     Barriers:    [] Co-Occurring Conditions [x] Medically Complex [x] Language  [] Transportation Issues [] Low Support [x] Poor compliance with medications/appts   Strengths:   [x] Motivated   [] Supportive friends/family [] Knowledge of disease  [] Sona/Sikhism  [] Overall good health  [] Pets    Short-Term Goals: Patient will be compliant with medication management and note improvement in symptoms over the next 4 to 6 weeks or at next scheduled visit.  Long-Term Goals: Patient will continue psychotherapy as well as medication regimen without impairment in daily functioning over the next 6 months.      Progress towards goals:   [] Minor [] Moderate [x] Little to None [] States improvement  Impairment:    [] Minor [x] Moderate [] Significant  [] Severe   Prognosis:    Good with ongoing treatment    Follow Up   -Patient instructed to keep follow up appointments and notify office if cancellation/reschedule is needed.  -Patient was given instructions and counseling regarding condition being managed and health maintenance advice. Please see specific information pulled into the AVS if appropriate.       ICD-10-CM ICD-9-CM   1.  Generalized anxiety disorder with panic attacks  F41.1 300.02    F41.0 300.01      New Medications Ordered This Visit   Medications   • mirtazapine (Remeron) 15 MG tablet     Sig: Take 1 tablet by mouth Every Night for 30 days. First 3 days take 1/2 tablet     Dispense:  30 tablet     Refill:  0   • clonazePAM (KlonoPIN) 1 MG tablet     Sig: Take 1 tablet by mouth 2 (Two) Times a Day As Needed for Anxiety.     Dispense:  60 tablet     Refill:  0       Errors in dictation may reflect use of voice recognition software and not all errors in transcription may have been detected prior to signing. Author states that some information has been carried over from previous notes/encounters, information has been reviewed and updated.

## 2022-07-05 NOTE — PATIENT INSTRUCTIONS
Plan of Care:  Medication changes today: YES, OK to start Remeron/mirtazapine 15 mg/day, first 3 days take 1/2 tablet, Take at bedtime, be in bed within 30 minutes.  OK to continue klonopin as previously ordered.   Follow up in 4 weeks.     General Instructions:  Patient to monitor for side effects, worsening symptoms, and/or improvement, report to PMHNP Chase burdick.  If a sooner appointment is needed please call office at number listed below to schedule.  Please request refills through your pharmacy prior to reaching out to office.  Please give office staff (1) week to schedule a referral, if you have not heard anything around that time call office and ask to speak to outgoing referral .    Willi Matias   Psychiatric Mental Health Nurse Practitioner (PMHNP)  [address] [place] [address]  P: [phone]  F: [phone]

## 2022-07-06 RX ORDER — CLONAZEPAM 1 MG/1
1 TABLET ORAL 2 TIMES DAILY PRN
Qty: 60 TABLET | Refills: 0 | Status: SHIPPED | OUTPATIENT
Start: 2022-07-06 | End: 2022-07-07

## 2022-07-06 NOTE — ASSESSMENT & PLAN NOTE
Psychological condition is unchanged.  Medication changes per orders.  Psychological condition  will be reassessed in 4 weeks.    Plan of Care:  1. Medication changes today: YES, OK to start Remeron/mirtazapine 15 mg/day, first 3 days take 1/2 tablet, Take at bedtime, be in bed within 30 minutes.  2. OK to continue klonopin as previously ordered.   3. Follow up in 4 weeks.

## 2022-07-07 ENCOUNTER — OFFICE VISIT (OUTPATIENT)
Dept: FAMILY MEDICINE CLINIC | Facility: CLINIC | Age: 27
End: 2022-07-07

## 2022-07-07 VITALS
HEART RATE: 96 BPM | TEMPERATURE: 98.7 F | OXYGEN SATURATION: 98 % | RESPIRATION RATE: 17 BRPM | HEIGHT: 71 IN | DIASTOLIC BLOOD PRESSURE: 70 MMHG | WEIGHT: 152 LBS | BODY MASS INDEX: 21.28 KG/M2 | SYSTOLIC BLOOD PRESSURE: 121 MMHG

## 2022-07-07 DIAGNOSIS — R76.8 ANA POSITIVE: Primary | ICD-10-CM

## 2022-07-07 DIAGNOSIS — E78.00 HIGH CHOLESTEROL: ICD-10-CM

## 2022-07-07 DIAGNOSIS — R79.89 LOW VITAMIN D LEVEL: ICD-10-CM

## 2022-07-07 DIAGNOSIS — R74.01 ALT (SGPT) LEVEL RAISED: ICD-10-CM

## 2022-07-07 DIAGNOSIS — R90.89 ABNORMAL FINDING ON MRI OF BRAIN: ICD-10-CM

## 2022-07-07 DIAGNOSIS — F41.9 ANXIETY: ICD-10-CM

## 2022-07-07 LAB
AMPHETAMINES UR QL SCN: NEGATIVE NG/ML
BARBITURATES UR QL SCN: NEGATIVE NG/ML
BENZODIAZ UR QL SCN: NEGATIVE NG/ML
BZE UR QL SCN: NEGATIVE NG/ML
CANNABINOIDS UR QL SCN: NEGATIVE NG/ML
CREAT UR-MCNC: 104.4 MG/DL (ref 20–300)
LABORATORY COMMENT REPORT: NORMAL
Lab: NORMAL
METHADONE UR QL SCN: NEGATIVE NG/ML
OPIATES UR QL SCN: NEGATIVE NG/ML
OXYCODONE+OXYMORPHONE UR QL SCN: NEGATIVE NG/ML
PCP UR QL: NEGATIVE NG/ML
PH UR: 7.4 [PH] (ref 4.5–8.9)
PROPOXYPH UR QL SCN: NEGATIVE NG/ML

## 2022-07-07 PROCEDURE — 99214 OFFICE O/P EST MOD 30 MIN: CPT | Performed by: FAMILY MEDICINE

## 2022-07-07 NOTE — PROGRESS NOTES
"Chief Complaint  Anxiety (Follow up)    Subjective        Brian Osorio presents to [facility]  History of Present Illness  Has no Villa Amol, per  , also saw Rheumatologist + ELIO  Awaiting more results, also pressure on neck and urinary frequency, also Anxiety seen by Psych on Remeron and Klonopin, also saw Urologist  ,   Objective   Vital Signs:  /70 (BP Location: Left arm, Patient Position: Sitting, Cuff Size: Adult)   Pulse 96   Temp 98.7 °F (37.1 °C) (Infrared)   Resp 17   Ht 180.3 cm (71\")   Wt 68.9 kg (152 lb)   SpO2 98%   BMI 21.20 kg/m²   Estimated body mass index is 21.2 kg/m² as calculated from the following:    Height as of this encounter: 180.3 cm (71\").    Weight as of this encounter: 68.9 kg (152 lb).    BMI is within normal parameters. No other follow-up for BMI required.      Physical Exam  Vitals and nursing note reviewed.   Constitutional:       General: He is not in acute distress.     Appearance: Normal appearance. He is well-developed. He is not ill-appearing, toxic-appearing or diaphoretic.   HENT:      Head: Normocephalic and atraumatic.      Right Ear: Tympanic membrane, ear canal and external ear normal. There is no impacted cerumen.      Left Ear: Tympanic membrane, ear canal and external ear normal. There is no impacted cerumen.      Nose: Nose normal. No congestion or rhinorrhea.      Mouth/Throat:      Mouth: Mucous membranes are moist.      Pharynx: Oropharynx is clear. No oropharyngeal exudate or posterior oropharyngeal erythema.   Eyes:      General: No scleral icterus.        Right eye: No discharge.         Left eye: No discharge.      Extraocular Movements: Extraocular movements intact.      Conjunctiva/sclera: Conjunctivae normal.      Pupils: Pupils are equal, round, and reactive to light.   Neck:      Thyroid: No thyromegaly.      Vascular: No carotid bruit or JVD.      Trachea: No tracheal deviation. "   Cardiovascular:      Rate and Rhythm: Normal rate and regular rhythm.      Heart sounds: Normal heart sounds. No murmur heard.    No friction rub. No gallop.   Pulmonary:      Effort: Pulmonary effort is normal. No respiratory distress.      Breath sounds: Normal breath sounds. No stridor. No wheezing, rhonchi or rales.   Chest:      Chest wall: No tenderness.   Abdominal:      General: Bowel sounds are normal. There is no distension.      Palpations: Abdomen is soft. There is no mass.      Tenderness: There is no abdominal tenderness. There is no right CVA tenderness, left CVA tenderness, guarding or rebound.      Hernia: No hernia is present.   Musculoskeletal:         General: Normal range of motion.      Cervical back: Normal range of motion and neck supple. No rigidity or tenderness.      Right lower leg: No edema.      Left lower leg: No edema.   Lymphadenopathy:      Cervical: No cervical adenopathy.   Skin:     General: Skin is warm and dry.      Coloration: Skin is not jaundiced.   Neurological:      General: No focal deficit present.      Mental Status: He is alert and oriented to person, place, and time. Mental status is at baseline.      Sensory: No sensory deficit.      Motor: No weakness or abnormal muscle tone.      Coordination: Coordination normal.      Gait: Gait normal.      Deep Tendon Reflexes: Reflexes normal.   Psychiatric:         Mood and Affect: Mood normal.         Behavior: Behavior normal.         Thought Content: Thought content normal.         Judgment: Judgment normal.        Result Review :                Assessment and Plan   Diagnoses and all orders for this visit:    1. ELIO positive (Primary)  -     Ambulatory Referral to Rheumatology    2. Abnormal finding on MRI of brain  -     Ambulatory Referral to Neurology    3. Low vitamin D level  -     CBC & Differential; Future  -     Comprehensive Metabolic Panel; Future  -     Lipid Panel; Future  -     Vitamin D 25 hydroxy;  Future    4. ALT (SGPT) level raised  -     CBC & Differential; Future  -     Comprehensive Metabolic Panel; Future  -     Lipid Panel; Future  -     Vitamin D 25 hydroxy; Future    5. High cholesterol  -     CBC & Differential; Future  -     Comprehensive Metabolic Panel; Future  -     Lipid Panel; Future  -     Vitamin D 25 hydroxy; Future             Follow Up   Return if symptoms worsen or fail to improve.  Patient was given instructions and counseling regarding his condition or for health maintenance advice. Please see specific information pulled into the AVS if appropriate.

## 2022-07-11 ENCOUNTER — TELEPHONE (OUTPATIENT)
Dept: FAMILY MEDICINE CLINIC | Facility: CLINIC | Age: 27
End: 2022-07-11

## 2022-07-11 NOTE — TELEPHONE ENCOUNTER
Patient called stating that he needed to speak with ARABELLA Matias about medication and blood work results. Patient requesting that provider call him back at his earliest convenience. Patient typically has  for appointments. When calling patient back please use [employer]  service by calling [phone]. They will ask for the patient's name and phone number as well as our Department ID: [id number].

## 2022-07-13 ENCOUNTER — TELEPHONE (OUTPATIENT)
Dept: FAMILY MEDICINE CLINIC | Facility: CLINIC | Age: 27
End: 2022-07-13

## 2022-07-13 NOTE — TELEPHONE ENCOUNTER
Patient called states he left a message for Wilil to call him on 7/11/22 about medication.  Asking to speak to him.  Advise patient that Willi was out of the office today and would be back tomorrow. Call back number.  [phone]

## 2022-07-14 ENCOUNTER — TELEPHONE (OUTPATIENT)
Dept: BEHAVIORAL HEALTH | Facility: CLINIC | Age: 27
End: 2022-07-14

## 2022-07-22 ENCOUNTER — TELEPHONE (OUTPATIENT)
Dept: BEHAVIORAL HEALTH | Facility: CLINIC | Age: 27
End: 2022-07-22

## 2022-07-22 NOTE — TELEPHONE ENCOUNTER
Second attempt to contact patient at listed number via [employer] service, no answer, voicemail left.

## 2022-07-26 ENCOUNTER — TELEPHONE (OUTPATIENT)
Dept: FAMILY MEDICINE CLINIC | Facility: CLINIC | Age: 27
End: 2022-07-26

## 2022-07-26 NOTE — TELEPHONE ENCOUNTER
Spoke with patient informing him that he needs to see current neurologist at [facility].

## 2022-08-04 ENCOUNTER — OFFICE VISIT (OUTPATIENT)
Dept: BEHAVIORAL HEALTH | Facility: CLINIC | Age: 27
End: 2022-08-04

## 2022-08-04 VITALS
DIASTOLIC BLOOD PRESSURE: 64 MMHG | RESPIRATION RATE: 18 BRPM | OXYGEN SATURATION: 98 % | HEART RATE: 83 BPM | WEIGHT: 153 LBS | SYSTOLIC BLOOD PRESSURE: 118 MMHG | HEIGHT: 71 IN | BODY MASS INDEX: 21.42 KG/M2

## 2022-08-04 DIAGNOSIS — F41.8 ANXIETY WITH SOMATIC FEATURES: Primary | ICD-10-CM

## 2022-08-04 PROCEDURE — 99214 OFFICE O/P EST MOD 30 MIN: CPT

## 2022-08-04 RX ORDER — CLONAZEPAM 0.5 MG/1
0.5 TABLET ORAL 2 TIMES DAILY PRN
Qty: 60 TABLET | Refills: 0 | Status: SHIPPED | OUTPATIENT
Start: 2022-08-04 | End: 2022-08-30 | Stop reason: SDUPTHER

## 2022-08-04 RX ORDER — MIRTAZAPINE 15 MG/1
15 TABLET, FILM COATED ORAL NIGHTLY
Qty: 30 TABLET | Refills: 2 | Status: SHIPPED | OUTPATIENT
Start: 2022-08-04 | End: 2022-09-29 | Stop reason: SDUPTHER

## 2022-08-04 RX ORDER — ARIPIPRAZOLE 2 MG/1
2 TABLET ORAL DAILY
Qty: 30 TABLET | Refills: 2 | Status: SHIPPED | OUTPATIENT
Start: 2022-08-04 | End: 2022-08-30

## 2022-08-04 NOTE — PATIENT INSTRUCTIONS
Plan of Care:  Klonopin 0.5 mg up to twice a day as needed for anxiety  Abilify 2 mg/day at night  Remeron 15 mg at bedtime for sleep  Read about somatic disorder  Follow Up 4 weeks    General Instructions:  Patient to monitor for side effects, worsening symptoms, and/or improvement, report to PMHNP Chase burdick.  If a sooner appointment is needed please call office at number listed below to schedule.  Please request refills through your pharmacy prior to reaching out to office.  Please give office staff (1) week to schedule a referral, if you have not heard anything around that time call office and ask to speak to outgoing referral .    Willi Matias   Psychiatric Mental Health Nurse Practitioner (PMHNP)  [address] [place] [address]  P: [phone]  F: [phone]

## 2022-08-04 NOTE — PROGRESS NOTES
[facility]  [facility]  [address]  [facility] [address] [phone]     Behavioral Health Note  Subjective   Brian Osorio is a 26 y.o. male who presents today for [x] Follow Up [] Initial Evaluation    CC: [] Depression [x] Anxiety [] Panic [] Mood [] Insomnia [] Psychosis [] Borderline [] PTSD [] ADHD    HPI:   Patient seen 1 month ago, since that time patient reports Remeron has helped his sleep, patient accompanied by Trinidad , miscommunication at last appointment, patient reports S/S with no medical cause that follow stopping Klonopin, I do not believe this is a true addiction, risk versus benefit of benzodiazepines discussed, patient states he does not want to be on it long-term.     Patient educated on somatic disorder, and that it is a diagnosis based on exclusion, patient educated on treatment options for that but that I would not 100% give him that diagnosis, stigma around disorder discussed.  Doctor's notes reviewed, still no medical cause for S/S found, CT and MRI were normal.  Risk versus benefit of Abilify discussed, patient interested, agreed to add Abilify, continue Remeron, and wean slowly off Klonopin, patient verbalized understanding, no other issues voiced.    New Medications:    [x] Denies [] Endorses  New Medical Conditions:   [x] Denies [] Endorses  Daily compliance with medications:  [] Denies [x] Endorses  Sleep Disturbance:      [] Denies [x] Endorses improving with remeron  Side effects to medications:    [x] Denies [] Endorses  Specialty Problems    None        The following portions of the patient's history were reviewed and updated as appropriate: allergies, current medications, past family history, past medical history, past surgical history and problem list.    Patient Active Problem List   Diagnosis   • Urinary frequency   • Bilateral low back pain without sciatica   • Fatty liver   • Gastroparesis   •  Villa-Danlos disease   • Spasm   • Low vitamin D level   • Wellness examination   • Abdominal discomfort   • Atypical chest pain   • Abnormal liver function tests   • Malnutrition of mild degree (Osorio: 75% to less than 90% of standard weight) ([facility])   • Incontinence   • MDD (major depressive disorder), recurrent episode, moderate ([facility])   • Generalized anxiety disorder with panic attacks   • Overflow incontinence   • Benign prostatic hyperplasia with lower urinary tract symptoms   • High risk medication use   • ALT (SGPT) level raised   • High cholesterol   • Anxiety   • ELIO positive   • Abnormal finding on MRI of brain   • Anxiety with somatic features     Medical History    Past Medical History:   Diagnosis Date   • Anxiety    • Chronic pain disorder    • Depression    • Liver disease    • Low back pain    • Neuromuscular disorder ([facility])    • Panic disorder        Past Medical History Pertinent Negatives:   Diagnosis Date Noted   • ADHD (attention deficit hyperactivity disorder) 03/21/2022   • Alcohol abuse 05/03/2022   • Alcoholism ([facility]) 03/23/2022   • Anorexia nervosa 05/03/2022   • Bipolar disorder ([facility]) 03/23/2022   • Disease of thyroid gland 03/23/2022   • Head injury 05/03/2022   • HIV disease ([facility]) 05/03/2022   • PTSD (post-traumatic stress disorder) 05/03/2022   • Schizoaffective disorder ([facility]) 05/03/2022   • Self-injurious behavior 05/03/2022   • Suicide attempt ([facility]) 03/23/2022     Surgical History   has a past surgical history that includes Appendectomy; Abdominal surgery; Back surgery; Bladder surgery (N/A, 03/02/2022); and Spine surgery.   Family History  family history is not on file.  Social History  Social History     Social History Narrative    Patient is a 26-year-old male, lives at home with mother, not employed currently, French-speaking  needed at medical appointments to facilitate communication, patient is able to understand verbal and written English      Social History  "    Tobacco Use   • Smoking status: Never Smoker   • Smokeless tobacco: Never Used   Substance Use Topics   • Alcohol use: Never   • Drug use: Never      Review of Systems   Constitutional: Negative.    Respiratory: Negative.   Cardiovascular: Negative.  Gastrointestinal: Negative.  Musculoskeletal: Negative.    Objective   Physical Exam  Constitutional:       Appearance: Normal appearance, clothing appropriate for age, appears in no acute distress  Musculoskeletal:         General: No problems with ambulation reported   Facial Expression:      Speech: Normal clear concise, no slurring or vocal tics observed.  Respiratory      Breaths appear even and unlabored, no cough observed.    Cardiac      No chest pain or shortness of breath reported     Vitals  Blood pressure 118/64, pulse 83, resp. rate 18, height 180.3 cm (71\"), weight 69.4 kg (153 lb), SpO2 98 %.  Body mass index is 21.34 kg/m².   Labs/Results  Recent Results (from the past 2016 hour(s))   CONV ANTINUCLEAR AB REFLEX CASCADE    Collection Time: 05/31/22  4:38 PM    Specimen type and source: Serum, Blood   Result Value Ref Range    ELIO POSITIVE (A) Negative (arb'U)   ELIO Titer    Collection Time: 05/31/22  4:38 PM    Specimen type and source: Serum,    Result Value Ref Range    ELIO Pattern Not Applicable (arb'U) (arb'U)    ELIO Titer 1 <1:80 <1:80 (arb'U)   Urine Drug Screen - Urine, Clean Catch    Collection Time: 07/05/22 12:00 AM    Specimen: Urine, Clean Catch    Urine  Release to vincent   Result Value Ref Range    Amphetamine, Urine Qual Negative Yijayh=9873 ng/mL    Barbiturates Screen, Urine Negative Cnxfml=283 ng/mL    Benzodiazepine Screen, Urine Negative Hlwfla=918 ng/mL    THC Screen, Urine Negative Cutoff=20 ng/mL    Cocaine Screen, Urine Negative Yeejwu=192 ng/mL    Opiate Screen, Urine Negative Rghcum=060 ng/mL    Oxycodone/Oxymorphone, Urine Negative Fviatg=541 ng/mL    Phencyclidine (PCP), Urine Negative Cutoff=25 ng/mL    Methadone Screen, " Urine Negative Kvsenk=233 ng/mL    Propoxyphene Screen Negative Gqzijy=444 ng/mL    Creatinine, Urine 104.4 20.0 - 300.0 mg/dL    pH, UA 7.4 4.5 - 8.9    Please note Comment    Please Note    Collection Time: 07/05/22 12:00 AM    Urine  Release to vincent   Result Value Ref Range    Please note Comment    POCT GLUCOSE FINGERSTICK    Collection Time: 07/09/22  4:05 PM    Specimen type and source: Serum,    Result Value Ref Range    Glucose 86 71 - 139 mg/dL   TYPE AND SCREEN    Collection Time: 07/09/22  4:06 PM    Specimen type and source: Whole Blood, Blood   Result Value Ref Range    ABORh O Positive     Antibody Screen Negative     Crossmatch Expiration 07/12/2022,2359    PROTIME-INR    Collection Time: 07/09/22  4:06 PM    Specimen: Fresh Frozen Plasma    Specimen type and source: Plasma, Blood   Result Value Ref Range    Protime 12.7 10.3 - 13.3 s    INR 1.1 INR INR   TROPONIN (IN-HOUSE)    Collection Time: 07/09/22  4:06 PM    Specimen: Fresh Frozen Plasma    Specimen type and source: Plasma, Blood   Result Value Ref Range    Troponin I <0.010 0.000 - 0.028 ng/mL   APTT    Collection Time: 07/09/22  4:06 PM    Specimen: Fresh Frozen Plasma    Specimen type and source: Plasma, Blood   Result Value Ref Range    PTT 39.9 (H) 25.1 - 36.5 s   CHLORIDE    Collection Time: 07/09/22  4:11 PM    Specimen: Fresh Frozen Plasma   Result Value Ref Range    Chloride 101 98 - 107 mmol/L   GLUCOSE, RANDOM    Collection Time: 07/09/22  4:11 PM    Specimen: Fresh Frozen Plasma   Result Value Ref Range    Glucose 100 71 - 139 mg/dL   POTASSIUM    Collection Time: 07/09/22  4:11 PM    Specimen: Fresh Frozen Plasma   Result Value Ref Range    Potassium 3.5 3.5 - 5.1 mmol/L   SODIUM    Collection Time: 07/09/22  4:11 PM    Specimen: Fresh Frozen Plasma   Result Value Ref Range    Sodium 142 136 - 145 mmol/L   BUN    Collection Time: 07/09/22  4:11 PM    Specimen: Fresh Frozen Plasma   Result Value Ref Range    BUN 14 7 - 20 mg/dL      Common labs    Common Labsle 11/18/21 11/18/21 11/18/21 4/1/22 4/1/22 7/9/22 7/9/22 7/9/22 7/9/22 7/9/22    0832 0832 0832 1016 1016 1611 1611 1611 1611 1611   Glucose  91   93        Glucose       100      BUN  15   13     14   Creatinine  0.92   1.03        eGFR Non  Am  116           eGFR  Am  134           Sodium  143   143    142    Potassium  4.4   4.0   3.5     Chloride  102   101 101       Calcium  9.7   9.9        Total Protein  7.4   7.3        Albumin  5.0   5.2        Total Bilirubin  0.4   0.8        Alkaline Phosphatase  59   60        AST (SGOT)  33   19        ALT (SGPT)  112 (A)   43        WBC 7.1            Hemoglobin 14.8            Hematocrit 46.1            Platelets 341            Total Cholesterol   180 157         Triglycerides   176 (A) 91         HDL Cholesterol   46 46         LDL Cholesterol    103 (A) 94         (A) Abnormal value       Comments are available for some flowsheets but are not being displayed.           Allergies  Allergies   Allergen Reactions   • Azithromycin Other (See Comments)     weakness      Current Outpatient Medications   Medication Sig Dispense Refill   • mirtazapine (Remeron) 15 MG tablet Take 1 tablet by mouth Every Night for 90 days. 30 tablet 2   • Myrbetriq 50 MG tablet sustained-release 24 hour 24 hr tablet Take 50 mg by mouth Daily.     • ARIPiprazole (Abilify) 2 MG tablet Take 1 tablet by mouth Daily for 90 days. 30 tablet 2   • clonazePAM (KlonoPIN) 0.5 MG tablet Take 1 tablet by mouth 2 (Two) Times a Day As Needed for Anxiety. 60 tablet 0     No current facility-administered medications for this visit.     Mental Status Exam:   Hygiene:   good  Cooperation:  Cooperative  Eye Contact:  Good  Psychomotor Behavior:  Appropriate  Affect:  Appropriate  Hopelessness: Denies  Speech:  Normal  Thought Process:  Goal directed  Thought Content:  Normal  Suicidal:  None  Homicidal:  None  Hallucinations:  None  Delusion:  None  Memory:   Intact  Orientation:  Person, Place, Time and Situation  Reliability:  fair  Insight:  Fair  Judgement:  Good  Impulse Control:  Fair    Assessment & Plan     Problem List Items Addressed This Visit        Mental Health    Anxiety with somatic features - Primary    Current Assessment & Plan     Plan of Care:  1. Klonopin 0.5 mg up to twice a day as needed for anxiety  2. Abilify 2 mg/day at night  3. Remeron 15 mg at bedtime for sleep  4. Read about somatic disorder  5. Follow Up 4 weeks         Relevant Medications    clonazePAM (KlonoPIN) 0.5 MG tablet    mirtazapine (Remeron) 15 MG tablet    ARIPiprazole (Abilify) 2 MG tablet         A thorough discussion was had that included review of disease process, need for continued monitoring and additional treatment options including use of pharmacological and non-pharmacological approaches to care, decisions were made and agreed upon by patient and provider. Discussed the risks, benefits, and potential side effects of the medications; patient ackowledged and verbally consented. Patient is advised to avoid driving or operating heavy machinery if they feel drowsy or over sedated. Patient is agreeable to call the office with any worsening of symptoms or onset of intolerable side effects. Patient is agreeable to call 911 or go to the nearest ER should he/she begin having SI/HI.     Barriers:    [x] Co-Occurring Conditions [x] Medically Complex   [x] Language   [] Transportation Issues   [x] Low Support  [] Poor compliance with medications/appts   Strengths:   [x] Motivated    [] Supportive friends/family   [] Knowledge of disease  [] Sona/Mandaen    [] Overall good health  [] Pets    Short-Term Goals: Patient will be compliant with medication management and note improvement in symptoms over the next 4 to 6 weeks or at next scheduled visit.  Long-Term Goals: Patient will continue psychotherapy as well as medication regimen without impairment in daily functioning over the  next 6 months.      Progress towards goals:     [x] Minor [] Moderate [] Little to None [] States improvement [] Newly Identified   Impairment:      [] Minor [x] Moderate [] Significant  [] Severe     Prognosis:  Good with ongoing treatment    Follow Up   -Patient instructed to keep follow up appointments and notify office if cancellation/reschedule is needed.  -Patient was given instructions and counseling regarding condition being managed and health maintenance advice. Please see specific information pulled into the AVS if appropriate.       ICD-10-CM ICD-9-CM   1. Anxiety with somatic features  F41.8 300.00      New Medications Ordered This Visit   Medications   • clonazePAM (KlonoPIN) 0.5 MG tablet     Sig: Take 1 tablet by mouth 2 (Two) Times a Day As Needed for Anxiety.     Dispense:  60 tablet     Refill:  0   • mirtazapine (Remeron) 15 MG tablet     Sig: Take 1 tablet by mouth Every Night for 90 days.     Dispense:  30 tablet     Refill:  2   • ARIPiprazole (Abilify) 2 MG tablet     Sig: Take 1 tablet by mouth Daily for 90 days.     Dispense:  30 tablet     Refill:  2       Errors in dictation may reflect use of voice recognition software and not all errors in transcription may have been detected prior to signing. Author states that some information has been carried over from previous notes/encounters, information has been reviewed and updated.

## 2022-08-04 NOTE — ASSESSMENT & PLAN NOTE
Plan of Care:  1. Klonopin 0.5 mg up to twice a day as needed for anxiety  2. Abilify 2 mg/day at night  3. Remeron 15 mg at bedtime for sleep  4. Read about somatic disorder  5. Follow Up 4 weeks

## 2022-08-30 ENCOUNTER — OFFICE VISIT (OUTPATIENT)
Dept: BEHAVIORAL HEALTH | Facility: CLINIC | Age: 27
End: 2022-08-30

## 2022-08-30 VITALS
OXYGEN SATURATION: 97 % | HEART RATE: 87 BPM | WEIGHT: 157 LBS | RESPIRATION RATE: 18 BRPM | SYSTOLIC BLOOD PRESSURE: 106 MMHG | HEIGHT: 71 IN | DIASTOLIC BLOOD PRESSURE: 58 MMHG | BODY MASS INDEX: 21.98 KG/M2

## 2022-08-30 DIAGNOSIS — F41.8 ANXIETY WITH SOMATIC FEATURES: Primary | ICD-10-CM

## 2022-08-30 DIAGNOSIS — F45.1 SOMATIC SYMPTOM DISORDER: ICD-10-CM

## 2022-08-30 DIAGNOSIS — Z79.899 HIGH RISK MEDICATION USE: ICD-10-CM

## 2022-08-30 DIAGNOSIS — Z78.9 LANGUAGE BARRIER AFFECTING HEALTH CARE: ICD-10-CM

## 2022-08-30 PROCEDURE — 99215 OFFICE O/P EST HI 40 MIN: CPT

## 2022-08-30 RX ORDER — CITALOPRAM 10 MG/1
TABLET ORAL
Qty: 49 TABLET | Refills: 0 | Status: SHIPPED | OUTPATIENT
Start: 2022-08-30 | End: 2022-09-29

## 2022-08-30 RX ORDER — CLONAZEPAM 0.5 MG/1
0.25 TABLET ORAL 2 TIMES DAILY PRN
Qty: 30 TABLET | Refills: 0 | Status: SHIPPED | OUTPATIENT
Start: 2022-08-30 | End: 2022-09-29 | Stop reason: SDUPTHER

## 2022-08-30 NOTE — ASSESSMENT & PLAN NOTE
Anxiety like S/S unchanged.  No full panic attacks reported.  Medication changes made  Patient to follow up with therapist  Patient to follow up with Willi in 4 weeks

## 2022-08-30 NOTE — PROGRESS NOTES
[facility]  [facility]  [address]  [facility] [address] [phone]     Behavioral Health Note  Subjective   Brian Osorio is a 26 y.o. male who presents today for [x] Follow Up [] Initial Evaluation    CC: [] Depression [x] Anxiety [] Panic [] Mood [] Insomnia [] Psychosis [] Borderline [] PTSD []     HPI:   Patient seen monthly, at last appointment Abilify 2 mg was added to Remeron to potentiate effects of medication that patient has been able to tolerate, multiple past meds patient has failed or not tolerated due to side effects.  Patient accompanied by  Dasha, communication severely hindered by language barrier, patient reports that he recently saw a neurologist that told him to see a psychiatrist, he saw a rheumatologist that told him to see a psychiatrist and he also saw a PCP that told him to see a psychiatrist, again communication severely hindered, patient educated on somatic symptom disorder, handout provided, this was discussed with him at prior appointment as well.     The medication that seems to help is Klonopin but he is also able to maintain some sort of homeostasis without it, patient has been off for a month in the past but during that time period he ended up in the emergency room for somatic symptoms.  Patient was referred to a Thai-speaking therapist prior says he only went once and got no benefit, patient instructed to make a follow-up appointment so I can speak with her and get collateral information.    Appointment time ran to 60 minutes today.    Conclusion of interview  It was not known to me that patient had some minimal improvement with Celexa prior it was agreed to start on 10 mg for a week and then increase to 20 mg and continue until follow-up appointment, weaning of Xanax discussed, lower dose ordered, she reports he was only taking 0.5 mg a day total anyway, handouts provided on Celexa, somatic  disorder for patient to review and we can discuss next appointment, patient and  verbalized understanding    Somatic symptoms  Often vague and include pressure in the head, pressure in the neck and chest, a burning sensation on the face, pressure in his lower back, trouble urinating.  Multiple medications in the past patient reports have worsened symptoms, only medication that is found to mildly reduce them is Klonopin.  No suspicion of drug abuse at this time.    · Prior Psychiatric Medication Trials:  · Abilify 2 mg patient took for 2 days and stopped due to worsening somatic symptoms  · Pristiq 50 mg, patient took for less than a week and stopped due to GI related symptoms  · Celexa 20 mg, reports taking for a month and it improved difficultly swallowing  · Xanax  · Zoloft, dose/duration unknown, unknown why stopped  · Buspar, dose/duration unknown, unknown why stopped  · Elavil, dose/duration unknown, patient stopped due to oversedation      New Medications:    [x] Denies [] Endorses  New Medical Conditions:   [x] Denies [] Endorses  Daily compliance with medications:  [] Denies [x] Endorses  Sleep Disturbance:      [x] Denies [] Endorses  Side effects to medications:    [x] Denies [] Endorses  Specialty Problems    None        The following portions of the patient's history were reviewed and updated as appropriate: allergies, current medications, past family history, past medical history, past surgical history and problem list.    Patient Active Problem List   Diagnosis   • Urinary frequency   • Bilateral low back pain without sciatica   • Fatty liver   • Gastroparesis   • Villa-Danlos disease   • Spasm   • Low vitamin D level   • Wellness examination   • Abdominal discomfort   • Atypical chest pain   • Abnormal liver function tests   • Malnutrition of mild degree (Osorio: 75% to less than 90% of standard weight) (HCC)   • Incontinence   • Generalized anxiety disorder with panic attacks   • Overflow  incontinence   • Benign prostatic hyperplasia with lower urinary tract symptoms   • High risk medication use   • ALT (SGPT) level raised   • High cholesterol   • ELIO positive   • Abnormal finding on MRI of brain   • Anxiety with somatic features   • Somatic symptom disorder   • Language barrier affecting health care     Medical History    Past Medical History:   Diagnosis Date   • Anxiety    • Chronic pain disorder    • Depression    • Liver disease    • Low back pain    • Neuromuscular disorder (HCC)    • Panic disorder        Past Medical History Pertinent Negatives:   Diagnosis Date Noted   • ADHD (attention deficit hyperactivity disorder) 03/21/2022   • Alcohol abuse 05/03/2022   • Alcoholism ([facility]) 03/23/2022   • Anorexia nervosa 05/03/2022   • Bipolar disorder ([facility]) 03/23/2022   • Disease of thyroid gland 03/23/2022   • Head injury 05/03/2022   • HIV disease ([facility]) 05/03/2022   • PTSD (post-traumatic stress disorder) 05/03/2022   • Schizoaffective disorder ([facility]) 05/03/2022   • Self-injurious behavior 05/03/2022   • Suicide attempt ([facility]) 03/23/2022     Surgical History   has a past surgical history that includes Appendectomy; Abdominal surgery; Back surgery; Bladder surgery (N/A, 03/02/2022); and Spine surgery.   Family History  family history is not on file.  Social History  Social History     Social History Narrative    Patient is a 26-year-old male, lives at home with mother, not employed currently, Yoruba-speaking  needed at medical appointments to facilitate communication, patient is able to understand verbal and written English      Social History     Tobacco Use   • Smoking status: Never Smoker   • Smokeless tobacco: Never Used   Substance Use Topics   • Alcohol use: Never   • Drug use: Never      Review of Systems   Constitutional: Negative.    Respiratory: Negative.   Cardiovascular: Negative.  Gastrointestinal: Negative.  Musculoskeletal: Negative.    Objective   Physical  "Exam  Constitutional:       Appearance: Normal appearance, clothing appropriate for age, appears in no acute distress  Musculoskeletal:         General: No problems with ambulation reported   Facial Expression:      Speech: Normal clear concise, no slurring or vocal tics observed.  Respiratory      Breaths appear even and unlabored, no cough observed.    Cardiac      No chest pain or shortness of breath reported     Vitals  Blood pressure 106/58, pulse 87, resp. rate 18, height 180.3 cm (71\"), weight 71.2 kg (157 lb), SpO2 97 %.  Body mass index is 21.9 kg/m².   Labs/Results  Recent Results (from the past 2016 hour(s))   Urine Drug Screen - Urine, Clean Catch    Collection Time: 07/05/22 12:00 AM    Specimen: Urine, Clean Catch    Urine  Release to vincent   Result Value Ref Range    Amphetamine, Urine Qual Negative Didjcg=1524 ng/mL    Barbiturates Screen, Urine Negative Llfgep=678 ng/mL    Benzodiazepine Screen, Urine Negative Kesecd=454 ng/mL    THC Screen, Urine Negative Cutoff=20 ng/mL    Cocaine Screen, Urine Negative Aiisqu=674 ng/mL    Opiate Screen, Urine Negative Crchdh=196 ng/mL    Oxycodone/Oxymorphone, Urine Negative Fiuyvk=031 ng/mL    Phencyclidine (PCP), Urine Negative Cutoff=25 ng/mL    Methadone Screen, Urine Negative Mqruwk=417 ng/mL    Propoxyphene Screen Negative Skjtzy=726 ng/mL    Creatinine, Urine 104.4 20.0 - 300.0 mg/dL    pH, UA 7.4 4.5 - 8.9    Please note Comment    Please Note    Collection Time: 07/05/22 12:00 AM    Urine  Release to vincent   Result Value Ref Range    Please note Comment    POCT GLUCOSE FINGERSTICK    Collection Time: 07/09/22  4:05 PM    Specimen type and source: Serum,    Result Value Ref Range    Glucose 86 71 - 139 mg/dL   TYPE AND SCREEN    Collection Time: 07/09/22  4:06 PM    Specimen type and source: Whole Blood, Blood   Result Value Ref Range    ABORh O Positive     Antibody Screen Negative     Crossmatch Expiration 07/12/2022,2359    PROTIME-INR    Collection " Time: 07/09/22  4:06 PM    Specimen: Fresh Frozen Plasma    Specimen type and source: Plasma, Blood   Result Value Ref Range    Protime 12.7 10.3 - 13.3 s    INR 1.1 INR INR   TROPONIN (IN-HOUSE)    Collection Time: 07/09/22  4:06 PM    Specimen: Fresh Frozen Plasma    Specimen type and source: Plasma, Blood   Result Value Ref Range    Troponin I <0.010 0.000 - 0.028 ng/mL   APTT    Collection Time: 07/09/22  4:06 PM    Specimen: Fresh Frozen Plasma    Specimen type and source: Plasma, Blood   Result Value Ref Range    PTT 39.9 (H) 25.1 - 36.5 s   CHLORIDE    Collection Time: 07/09/22  4:11 PM    Specimen: Fresh Frozen Plasma   Result Value Ref Range    Chloride 101 98 - 107 mmol/L   GLUCOSE, RANDOM    Collection Time: 07/09/22  4:11 PM    Specimen: Fresh Frozen Plasma   Result Value Ref Range    Glucose 100 71 - 139 mg/dL   POTASSIUM    Collection Time: 07/09/22  4:11 PM    Specimen: Fresh Frozen Plasma   Result Value Ref Range    Potassium 3.5 3.5 - 5.1 mmol/L   SODIUM    Collection Time: 07/09/22  4:11 PM    Specimen: Fresh Frozen Plasma   Result Value Ref Range    Sodium 142 136 - 145 mmol/L   BUN    Collection Time: 07/09/22  4:11 PM    Specimen: Fresh Frozen Plasma   Result Value Ref Range    BUN 14 7 - 20 mg/dL     Common labs    Common Labsle 11/18/21 11/18/21 11/18/21 4/1/22 4/1/22 7/9/22 7/9/22 7/9/22 7/9/22 7/9/22    0832 0832 0832 1016 1016 1611 1611 1611 1611 1611   Glucose  91   93        Glucose       100      BUN  15   13     14   Creatinine  0.92   1.03        eGFR Non  Am  116           eGFR  Am  134           Sodium  143   143    142    Potassium  4.4   4.0   3.5     Chloride  102   101 101       Calcium  9.7   9.9        Total Protein  7.4   7.3        Albumin  5.0   5.2        Total Bilirubin  0.4   0.8        Alkaline Phosphatase  59   60        AST (SGOT)  33   19        ALT (SGPT)  112 (A)   43        WBC 7.1            Hemoglobin 14.8            Hematocrit 46.1             Platelets 341            Total Cholesterol   180 157         Triglycerides   176 (A) 91         HDL Cholesterol   46 46         LDL Cholesterol    103 (A) 94         (A) Abnormal value       Comments are available for some flowsheets but are not being displayed.           Allergies  Allergies   Allergen Reactions   • Azithromycin Other (See Comments)     weakness      Current Outpatient Medications   Medication Sig Dispense Refill   • clonazePAM (KlonoPIN) 0.5 MG tablet Take 0.5 tablets by mouth 2 (Two) Times a Day As Needed for Anxiety for up to 30 days. 30 tablet 0   • mirtazapine (Remeron) 15 MG tablet Take 1 tablet by mouth Every Night for 90 days. 30 tablet 2   • citalopram (CeleXA) 10 MG tablet Take 1 tablet by mouth Daily for 7 days, THEN 2 tablets Daily for 21 days. 49 tablet 0   • Myrbetriq 50 MG tablet sustained-release 24 hour 24 hr tablet Take 50 mg by mouth Daily.       No current facility-administered medications for this visit.     Mental Status Exam:   Hygiene:   good  Cooperation:  Cooperative  Eye Contact:  Good  Psychomotor Behavior:  Appropriate  Affect:  Appropriate  Hopelessness: Denies  Speech:  Normal  Thought Process:  Goal directed  Thought Content:  Normal  Suicidal:  None  Homicidal:  None  Hallucinations:  None  Delusion:  None  Memory:  Intact  Orientation:  Person, Place, Time and Situation  Reliability:  fair  Insight:  Fair  Judgement:  Good  Impulse Control:  Fair    Assessment & Plan     Problem List Items Addressed This Visit        Advance Directives and General Issues    Language barrier affecting health care    Current Assessment & Plan     Communication severely hindered by language barrier, patient requires a   Referral placed for [place]-speaking therapist            Allergies and Adverse Reactions    High risk medication use    Current Assessment & Plan     Long-term use of benzodiazepines reported, nonnarcotic anxiety medications have proved ineffective.  Risk  versus benefit of benzodiazepines discussed and need to slowly wean off of medication over time.  We will reassess at next appointment            Mental Health    Anxiety with somatic features - Primary    Current Assessment & Plan     Anxiety like S/S unchanged.  No full panic attacks reported.  Medication changes made  Patient to follow up with therapist  Patient to follow up with Willi in 4 weeks         Relevant Medications    mirtazapine (Remeron) 15 MG tablet    clonazePAM (KlonoPIN) 0.5 MG tablet    Somatic symptom disorder    Overview     Somatic symptoms  Often vague and include pressure in the head, pressure in the neck and chest, a burning sensation on the face, pressure in his lower back, trouble urinating.  Multiple medications in the past patient reports have worsened symptoms, only medication that is found to mildly reduce them is Klonopin.  No suspicion of drug abuse at this time.    · Prior Psychiatric Medication Trials:  · Abilify 2 mg patient took for 2 days and stopped due to worsening somatic symptoms  · Pristiq 50 mg, patient took for less than a week and stopped due to GI related symptoms  · Celexa 20 mg, reports taking for a month and it improved difficultly swallowing  · Xanax  · Zoloft, dose/duration unknown, unknown why stopped  · Buspar, dose/duration unknown, unknown why stopped  · Elavil, dose/duration unknown, patient stopped due to oversedation         Current Assessment & Plan     Psychological condition is newly identified.  Medication changes per orders.  Referral to psychological counseling.  Psychological condition  will be reassessed in 4 weeks.  Referral placed to psychiatry for second opinion         Relevant Medications    mirtazapine (Remeron) 15 MG tablet    citalopram (CeleXA) 10 MG tablet         A thorough discussion was had that included review of disease process, need for continued monitoring and additional treatment options including use of pharmacological and  non-pharmacological approaches to care, decisions were made and agreed upon by patient and provider. Discussed the risks, benefits, and potential side effects of the medications; patient ackowledged and verbally consented. Patient is advised to avoid driving or operating heavy machinery if they feel drowsy or over sedated. Patient is agreeable to call the office with any worsening of symptoms or onset of intolerable side effects. Patient is agreeable to call 911 or go to the nearest ER should he/she begin having SI/HI.     Barriers:    [x] Co-Occurring Conditions [x] Medically Complex   [] Financial    [x]language, patient speaks Stateless   [x] Low Support  [x] Poor compliance with medications  Strengths:   [x] Motivated    [] Supportive friends/family   [] Knowledge of disease  [] Sona/Uatsdin    [] Overall good health  [] Pets    Short-Term Goals: Patient will be compliant with medication management and note improvement in symptoms over the next 4 to 6 weeks or at next scheduled visit.  Long-Term Goals: Patient will continue psychotherapy as well as medication regimen without impairment in daily functioning over the next 6 months.      Progress towards goals:     [] Minor [] Moderate [x] Little to None [] States improvement [] Newly Identified   Impairment:      [] Minor [x] Moderate [x] Significant  [] Severe     Prognosis: Fair with ongoing treatment    Follow Up   -Patient instructed to keep follow up appointments and notify office if cancellation/reschedule is needed.  -Patient was given instructions and counseling regarding condition being managed and health maintenance advice. Please see specific information pulled into the AVS if appropriate.       ICD-10-CM ICD-9-CM   1. Anxiety with somatic features  F41.8 300.00   2. Somatic symptom disorder  F45.1 300.82   3. Language barrier affecting health care  Z78.9 V49.89   4. High risk medication use  Z79.899 V58.69      New Medications Ordered This Visit    Medications   • citalopram (CeleXA) 10 MG tablet     Sig: Take 1 tablet by mouth Daily for 7 days, THEN 2 tablets Daily for 21 days.     Dispense:  49 tablet     Refill:  0   • clonazePAM (KlonoPIN) 0.5 MG tablet     Sig: Take 0.5 tablets by mouth 2 (Two) Times a Day As Needed for Anxiety for up to 30 days.     Dispense:  30 tablet     Refill:  0     I spent 60 minutes caring for this patient on date of service. This time includes time spent by me in the following activities: preparing for the visit, obtaining and/or reviewing a separately obtained history, performing a medically appropriate examination and/or evaluation , counseling and educating the patient/family/caregiver, ordering medications, tests, or procedures, documenting information in the medical record and care coordination    Errors in dictation may reflect use of voice recognition software and not all errors in transcription may have been detected prior to signing. Author states that some information has been carried over from previous notes/encounters, information has been reviewed and updated.

## 2022-08-30 NOTE — ASSESSMENT & PLAN NOTE
Communication severely hindered by language barrier, patient requires a   Referral placed for Gibraltarian-speaking therapist

## 2022-08-30 NOTE — PATIENT INSTRUCTIONS
Plan of Care:  Continue Remeron/mirtazapine 15 mg tablet at bedtime, continue lower dose klonopin  OK to start Celexa 10 mg (1) tablet/day for 1 week then increase to 2 tablets/day for three weeks   Follow Up in 4 weeks  Referral to psychiatry for second opinion ordered    General Instructions:  Patient to monitor for side effects, worsening symptoms, and/or improvement, report to PMHNP Chase burdick.  If a sooner appointment is needed please call office at number listed below to schedule.  Please request refills through your pharmacy prior to reaching out to office.  Please give office staff (1) week to schedule a referral, if you have not heard anything around that time call office and ask to speak to outgoing referral .    Willi Matias   Psychiatric Mental Health Nurse Practitioner (PMHNP)  [address] [place] [address]  P: [phone]  F: [phone]

## 2022-08-30 NOTE — ASSESSMENT & PLAN NOTE
Psychological condition is newly identified.  Medication changes per orders.  Referral to psychological counseling.  Psychological condition  will be reassessed in 4 weeks.  Referral placed to psychiatry for second opinion

## 2022-08-31 DIAGNOSIS — Z78.9 LANGUAGE BARRIER AFFECTING HEALTH CARE: ICD-10-CM

## 2022-08-31 DIAGNOSIS — F45.1 SOMATIC SYMPTOM DISORDER: Primary | ICD-10-CM

## 2022-08-31 DIAGNOSIS — F41.8 ANXIETY WITH SOMATIC FEATURES: ICD-10-CM

## 2022-08-31 NOTE — PROGRESS NOTES
Referral placed to [facility] for Psychiatry services, pt needs a second opinion, multiple failed medication trials, diagnosis unclear, multiple medical providers have told pt that he needs to see a psychiatrist, I have been unable to successfully care/treat the pt except for providing Remeron/klonopin that have only been minimally effective. Will contact pt and let him know of referral.  Chase SANTIAGO

## 2022-09-29 ENCOUNTER — OFFICE VISIT (OUTPATIENT)
Dept: BEHAVIORAL HEALTH | Facility: CLINIC | Age: 27
End: 2022-09-29

## 2022-09-29 VITALS
OXYGEN SATURATION: 97 % | HEIGHT: 71 IN | BODY MASS INDEX: 21.56 KG/M2 | HEART RATE: 88 BPM | RESPIRATION RATE: 18 BRPM | DIASTOLIC BLOOD PRESSURE: 68 MMHG | WEIGHT: 154 LBS | SYSTOLIC BLOOD PRESSURE: 128 MMHG

## 2022-09-29 DIAGNOSIS — Z78.9 LANGUAGE BARRIER AFFECTING HEALTH CARE: ICD-10-CM

## 2022-09-29 DIAGNOSIS — F45.1 SOMATIC SYMPTOM DISORDER: Primary | ICD-10-CM

## 2022-09-29 DIAGNOSIS — F41.8 ANXIETY WITH SOMATIC FEATURES: ICD-10-CM

## 2022-09-29 PROCEDURE — 99214 OFFICE O/P EST MOD 30 MIN: CPT

## 2022-09-29 RX ORDER — MIRTAZAPINE 15 MG/1
15 TABLET, FILM COATED ORAL NIGHTLY
Qty: 30 TABLET | Refills: 2 | Status: SHIPPED | OUTPATIENT
Start: 2022-09-29 | End: 2022-11-30

## 2022-09-29 RX ORDER — CLONAZEPAM 0.5 MG/1
0.25 TABLET ORAL 2 TIMES DAILY PRN
Qty: 30 TABLET | Refills: 0 | Status: SHIPPED | OUTPATIENT
Start: 2022-09-29 | End: 2022-10-28 | Stop reason: SDUPTHER

## 2022-09-29 NOTE — PROGRESS NOTES
Subjective   Patient ID: Brian Osorio is a 26 y.o. male is here today for follow-up..     History of Present Illness  Patient answered questionnaires online about pain and physical S/S.     Patient accompanied today by Kendra sanzator    No benefit from Celexa after taking for around 4 weeks even though it helped him prior.  Denies major sleep problems reports he is seeing Janis Duran therapist a week and a half ago that is the second time seeing her, he said that she explained he has a lot of psychological issues, has an appointment next Wednesday.    Language barrier continues to be an issue, multiple failed medications reported continued to be an issue, patient educated on somatic disorder and need for a second opinion by a psychiatrist or someone that is more experience with somatic disorder and psychotic disorders, patient verbalized understanding and agreed to call [facility] psychiatry to make an appointment, no other issues voiced.  Back Pain  This is a chronic problem. The current episode started more than 1 year ago. The problem occurs constantly. The quality of the pain is described as burning. The pain is at a severity of 10/10. The pain is the same all the time. The symptoms are aggravated by lying down, sitting, standing and stress. Stiffness is present all day. Associated symptoms include bladder incontinence, chest pain, dysuria, leg pain and pelvic pain. Pertinent negatives include no abdominal pain, bowel incontinence, fever, headaches, paresis or paresthesias.     Answers for HPI/ROS submitted by the patient on 9/29/2022  What is the primary reason for your visit?: Back Pain  Chronicity: chronic  Onset: more than 1 year ago  Frequency: constantly  Pain quality: burning  Pain - numeric: 10/10  Pain is: the same all the time  Aggravated by: lying down, sitting, standing, stress  Stiffness is present: all day  abdominal pain: No  bladder incontinence: Yes  bowel incontinence:  No  chest pain: Yes  dysuria: Yes  fever: No  headaches: No  leg pain: Yes  paresis: No  paresthesias: No  pelvic pain: Yes     Prior Psychiatric Medication Trials:  · Klonopin 1 mg  · Pristiq  · Celexa  · Xanax  · Zoloft  · Buspar  · Elavil    The following portions of the patient's history were reviewed and updated as appropriate:   He  has a past medical history of Anxiety, Chronic pain disorder, Depression, Liver disease, Low back pain, Neuromuscular disorder (HCC), and Panic disorder.  He does not have any pertinent problems on file.  He  has a past surgical history that includes Appendectomy; Abdominal surgery; Back surgery; Bladder surgery (N/A, 03/02/2022); and Spine surgery.  His family history is not on file.  He  reports that he has never smoked. He has never used smokeless tobacco. He reports that he does not drink alcohol and does not use drugs.  Current Outpatient Medications   Medication Sig Dispense Refill   • clonazePAM (KlonoPIN) 0.5 MG tablet Take 0.5 tablets by mouth 2 (Two) Times a Day As Needed for Anxiety for up to 30 days. 30 tablet 0   • mirtazapine (Remeron) 15 MG tablet Take 1 tablet by mouth Every Night for 90 days. 30 tablet 2   • Myrbetriq 50 MG tablet sustained-release 24 hour 24 hr tablet Take 50 mg by mouth Daily.       No current facility-administered medications for this visit.     Current Outpatient Medications on File Prior to Visit   Medication Sig   • Myrbetriq 50 MG tablet sustained-release 24 hour 24 hr tablet Take 50 mg by mouth Daily.   • [DISCONTINUED] clonazePAM (KlonoPIN) 0.5 MG tablet Take 0.5 tablets by mouth 2 (Two) Times a Day As Needed for Anxiety for up to 30 days.   • [DISCONTINUED] citalopram (CeleXA) 10 MG tablet Take 1 tablet by mouth Daily for 7 days, THEN 2 tablets Daily for 21 days.   • [DISCONTINUED] mirtazapine (Remeron) 15 MG tablet Take 1 tablet by mouth Every Night for 90 days.     No current facility-administered medications on file prior to visit.  "    He is allergic to azithromycin..    Review of Systems   Constitutional: Negative for fever.   Respiratory: Negative.    Cardiovascular: Positive for chest pain.   Gastrointestinal: Negative for abdominal pain and bowel incontinence.   Genitourinary: Positive for bladder incontinence, dysuria and pelvic pain.   Musculoskeletal: Positive for back pain.   Neurological: Negative for headaches and paresthesias.   Psychiatric/Behavioral: Negative for sleep disturbance. The patient is nervous/anxious.        Objective   Mental Status Exam  Appearance:  clean and casually dressed, appropriate  Attitude toward clinician:  cooperative and agreeable   Speech:    Rate:  regular rate and rhythm   Volume:  soft   Motor:  no abnormal movements present  Mood:  Unable to assess  Affect:  unable to assess  Thought Processes:  linear, logical, and goal directed  Thought Content:  normal  Suicidal Thoughts:  absent  Homicidal Thoughts:  absent  Perceptual Disturbance: tactile hallucinations  Attention and Concentration:  fair  Insight and Judgement:  poor  Memory:  memory appears to be intact  Physical Exam   Vitals  Blood pressure 128/68, pulse 88, resp. rate 18, height 180.3 cm (71\"), weight 69.9 kg (154 lb), SpO2 97 %.  Body mass index is 21.48 kg/m².   Lab Review:   No visits with results within 2 Month(s) from this visit.   Latest known visit with results is:   Office Visit on 07/05/2022   Component Date Value   • Amphetamine, Urine Qual 07/05/2022 Negative    • Barbiturates Screen, Uri* 07/05/2022 Negative    • Benzodiazepine Screen, U* 07/05/2022 Negative    • THC Screen, Urine 07/05/2022 Negative    • Cocaine Screen, Urine 07/05/2022 Negative    • Opiate Screen, Urine 07/05/2022 Negative    • Oxycodone/Oxymorphone, U* 07/05/2022 Negative    • Phencyclidine (PCP), Uri* 07/05/2022 Negative    • Methadone Screen, Urine 07/05/2022 Negative    • Propoxyphene Screen 07/05/2022 Negative    • Creatinine, Urine 07/05/2022 104.4    • " GAVIN Barnhart 07/05/2022 7.4    • Please note 07/05/2022 Comment    • Please note 07/05/2022 Comment      Assessment & Plan   Diagnoses and all orders for this visit:    1. Somatic symptom disorder (Primary)  Assessment & Plan:  Psychological condition is unchanged.  Medication changes per orders.  Referral to psychological counseling.  Referral to psychiatry.  Psychological condition  will be reassessed 6 weeks.      2. Anxiety with somatic features  -     clonazePAM (KlonoPIN) 0.5 MG tablet; Take 0.5 tablets by mouth 2 (Two) Times a Day As Needed for Anxiety for up to 30 days.  Dispense: 30 tablet; Refill: 0  -     mirtazapine (Remeron) 15 MG tablet; Take 1 tablet by mouth Every Night for 90 days.  Dispense: 30 tablet; Refill: 2    3. Language barrier affecting health care    Return in 6 weeks (on 11/10/2022) for Recheck.     A thorough discussion was had that included review of disease process, need for continued monitoring and additional treatment options including use of pharmacological and non-pharmacological approaches to care, decisions were made and agreed upon by patient and provider. Discussed the risks, benefits, and potential side effects of the medications; patient ackowledged and verbally consented. Patient is advised to avoid driving or operating heavy machinery if they feel drowsy or over sedated. Patient is agreeable to call the office with any worsening of symptoms or onset of intolerable side effects. Patient is agreeable to call 911 or go to the nearest ER should he/she begin having SI/HI.     Barriers:    [x] Co-Occurring Conditions [] Medically Complex [] Language/need for   [] Financial    [] Transportation Issues   [] Low Support  [] Poor compliance with medications/appts   Strengths:   [x] Motivated    [] Supportive friends/family   [] Knowledge of disease  [] Sona/Congregation    [] Overall good health  [] Pets    Short-Term Goals: Patient will be compliant with medication management and  note improvement in symptoms over the next 4 to 6 weeks or at next scheduled visit.    Long-Term Goals: Patient will continue psychotherapy as well as medication regimen without impairment in daily functioning over the next 6 months.      Progress towards goals:     [] Minor [] Moderate [x] Little to None [] States improvement [] Newly Identified   Impairment:      [] Minor [x] Moderate [] Significant  [] Severe     Prognosis:  fair with ongoing treatment    Follow Up   -Patient instructed to keep follow up appointments and notify office if cancellation/reschedule is needed.  -Patient was given instructions and counseling regarding condition being managed and health maintenance advice. Please see specific information pulled into the AVS if appropriate.     Errors in dictation may reflect use of voice recognition software and not all errors in transcription may have been detected prior to signing. Author states that some information has been carried over from previous notes/encounters, information has been reviewed and updated.

## 2022-09-29 NOTE — PATIENT INSTRUCTIONS
Plan of Care:  Stop Celexa due to little to no benefit  Follow Up with Chase in 6 weeks  Continue counseling with Danie Duran  Second opinion needed, prior referral to [facility] (pt did not hear anything)  Patient to call [facility] physicians for Psychiatry for second appointment    General Instructions:  Patient to monitor for side effects, worsening symptoms, and/or improvement, report to PMHNP Chase immediatlemacario.  If a sooner appointment is needed please call office at number listed below to schedule.  Please request refills through your pharmacy prior to reaching out to office.  Please give office staff (1) week to schedule a referral, if you have not heard anything around that time call office and ask to speak to outgoing referral .    Willi Matias   Psychiatric Mental Health Nurse Practitioner (PMHNP)  [address] [place] [address]  P: [phone]  F: [phone]

## 2022-09-29 NOTE — PROGRESS NOTES
Patient reports he received a [employer] [employer] message about referral to [facility] for a second opinion but he never heard anything from [facility].  Will have someone from my office contact [facility] directly to schedule appointment for a second opinion, will fax on needed records.

## 2022-09-29 NOTE — ASSESSMENT & PLAN NOTE
Psychological condition is unchanged.  Medication changes per orders.  Referral to psychological counseling.  Referral to psychiatry.  Psychological condition  will be reassessed 6 weeks.

## 2022-09-30 ENCOUNTER — TELEPHONE (OUTPATIENT)
Dept: BEHAVIORAL HEALTH | Facility: CLINIC | Age: 27
End: 2022-09-30

## 2022-10-02 DIAGNOSIS — F41.8 ANXIETY WITH SOMATIC FEATURES: ICD-10-CM

## 2022-10-02 DIAGNOSIS — F45.1 SOMATIC SYMPTOM DISORDER: Primary | ICD-10-CM

## 2022-10-02 NOTE — PROGRESS NOTES
Was informed that prior referral to [facility] was not successful due to them only offering inpatient or outpatient group therapy programs and not psychiatry.    New referral placed, urgent referral to [facility] psychiatry for second opinion

## 2022-10-28 DIAGNOSIS — F41.8 ANXIETY WITH SOMATIC FEATURES: ICD-10-CM

## 2022-10-28 RX ORDER — CLONAZEPAM 0.5 MG/1
0.25 TABLET ORAL 2 TIMES DAILY PRN
Qty: 30 TABLET | Refills: 0 | Status: SHIPPED | OUTPATIENT
Start: 2022-10-28 | End: 2023-02-07 | Stop reason: SDUPTHER

## 2022-11-01 DIAGNOSIS — F41.8 ANXIETY WITH SOMATIC FEATURES: ICD-10-CM

## 2022-11-01 RX ORDER — CLONAZEPAM 0.5 MG/1
0.25 TABLET ORAL 2 TIMES DAILY PRN
Qty: 30 TABLET | Refills: 0 | Status: CANCELLED | OUTPATIENT
Start: 2022-11-01 | End: 2022-12-01

## 2022-11-27 DIAGNOSIS — M54.50 BILATERAL LOW BACK PAIN WITHOUT SCIATICA, UNSPECIFIED CHRONICITY: ICD-10-CM

## 2022-11-27 DIAGNOSIS — R25.2 SPASM: ICD-10-CM

## 2022-11-28 RX ORDER — BACLOFEN 20 MG/1
TABLET ORAL
Qty: 90 TABLET | Refills: 1 | OUTPATIENT
Start: 2022-11-28

## 2022-12-08 ENCOUNTER — OFFICE VISIT (OUTPATIENT)
Dept: FAMILY MEDICINE CLINIC | Facility: CLINIC | Age: 27
End: 2022-12-08

## 2022-12-08 VITALS
BODY MASS INDEX: 21.14 KG/M2 | SYSTOLIC BLOOD PRESSURE: 112 MMHG | HEART RATE: 78 BPM | HEIGHT: 71 IN | DIASTOLIC BLOOD PRESSURE: 80 MMHG | OXYGEN SATURATION: 98 % | TEMPERATURE: 98.3 F | WEIGHT: 151 LBS

## 2022-12-08 DIAGNOSIS — E78.00 HIGH CHOLESTEROL: ICD-10-CM

## 2022-12-08 DIAGNOSIS — R79.89 LOW VITAMIN D LEVEL: ICD-10-CM

## 2022-12-08 DIAGNOSIS — G03.9 ARACHNOIDITIS: Primary | ICD-10-CM

## 2022-12-08 DIAGNOSIS — M54.50 BILATERAL LOW BACK PAIN WITHOUT SCIATICA, UNSPECIFIED CHRONICITY: ICD-10-CM

## 2022-12-08 DIAGNOSIS — R25.2 SPASM: ICD-10-CM

## 2022-12-08 DIAGNOSIS — F45.9 PSYCHOSOMATIC DISORDER: ICD-10-CM

## 2022-12-08 DIAGNOSIS — R82.90 ABNORMAL URINE: ICD-10-CM

## 2022-12-08 LAB
BILIRUB BLD-MCNC: ABNORMAL MG/DL
CLARITY, POC: CLEAR
COLOR UR: YELLOW
EXPIRATION DATE: ABNORMAL
GLUCOSE UR STRIP-MCNC: NEGATIVE MG/DL
KETONES UR QL: NEGATIVE
LEUKOCYTE EST, POC: NEGATIVE
Lab: ABNORMAL
NITRITE UR-MCNC: NEGATIVE MG/ML
PH UR: 6 [PH] (ref 5–8)
PROT UR STRIP-MCNC: ABNORMAL MG/DL
RBC # UR STRIP: ABNORMAL /UL
SP GR UR: 1.02 (ref 1–1.03)
UROBILINOGEN UR QL: NORMAL

## 2022-12-08 PROCEDURE — 99214 OFFICE O/P EST MOD 30 MIN: CPT | Performed by: FAMILY MEDICINE

## 2022-12-08 RX ORDER — BACLOFEN 5 MG/1
5 TABLET ORAL 3 TIMES DAILY
Qty: 30 TABLET | Refills: 1 | Status: SHIPPED | OUTPATIENT
Start: 2022-12-08 | End: 2023-01-25 | Stop reason: SDUPTHER

## 2022-12-08 RX ORDER — MELOXICAM 7.5 MG/1
7.5 TABLET ORAL DAILY PRN
Qty: 30 TABLET | Refills: 1 | Status: SHIPPED | OUTPATIENT
Start: 2022-12-08 | End: 2023-01-26 | Stop reason: SDUPTHER

## 2022-12-08 NOTE — PROGRESS NOTES
"Chief Complaint  Anxiety    Subjective        Shaheeder Geremias Osorio presents to [facility]  History of Present Illness  Saw Dr Matias , Psych and Dx with Psychosomatic Disorder, then pt saw MD in [place] and Dx with Arachnoiditis, was told needs a Neurosurgeon or pain Mgt, tried many SSRIs , to see Psych soon, wants Pain Mgt referral  also, patient also applying for disability, he wanted some paperwork but we do not do any disability assessment here because we have no  equipment to do that, paperwork is asking about how much he can push, pull, lift, etc, patient is still having low back pain, rated 10/10, he did try diclofenac in the past, he would like to try a different anti-inflammatory medication, and a refill on baclofen, also he is fasting, would like to do routine blood tests  Objective   Vital Signs:  /80 (BP Location: Left arm, Patient Position: Sitting, Cuff Size: Adult)   Pulse 78   Temp 98.3 °F (36.8 °C) (Temporal)   Ht 180.3 cm (70.98\")   Wt 68.5 kg (151 lb)   SpO2 98%   BMI 21.07 kg/m²   Estimated body mass index is 21.07 kg/m² as calculated from the following:    Height as of this encounter: 180.3 cm (70.98\").    Weight as of this encounter: 68.5 kg (151 lb).    BMI is within normal parameters. No other follow-up for BMI required.      Physical Exam  Vitals and nursing note reviewed.   Constitutional:       General: He is not in acute distress.     Appearance: Normal appearance. He is well-developed. He is not ill-appearing, toxic-appearing or diaphoretic.   HENT:      Head: Normocephalic and atraumatic.      Right Ear: Tympanic membrane, ear canal and external ear normal. There is no impacted cerumen.      Left Ear: Tympanic membrane, ear canal and external ear normal. There is no impacted cerumen.      Nose: Nose normal. No congestion or rhinorrhea.      Mouth/Throat:      Mouth: Mucous membranes are moist.      Pharynx: Oropharynx is clear. No " oropharyngeal exudate or posterior oropharyngeal erythema.   Eyes:      General: No scleral icterus.        Right eye: No discharge.         Left eye: No discharge.      Extraocular Movements: Extraocular movements intact.      Conjunctiva/sclera: Conjunctivae normal.      Pupils: Pupils are equal, round, and reactive to light.   Neck:      Thyroid: No thyromegaly.      Vascular: No carotid bruit or JVD.      Trachea: No tracheal deviation.   Cardiovascular:      Rate and Rhythm: Normal rate and regular rhythm.      Heart sounds: Normal heart sounds. No murmur heard.    No friction rub. No gallop.   Pulmonary:      Effort: Pulmonary effort is normal. No respiratory distress.      Breath sounds: Normal breath sounds. No stridor. No wheezing, rhonchi or rales.   Chest:      Chest wall: No tenderness.   Abdominal:      General: Bowel sounds are normal. There is no distension.      Palpations: Abdomen is soft. There is no mass.      Tenderness: There is no abdominal tenderness. There is no right CVA tenderness, left CVA tenderness, guarding or rebound.      Hernia: No hernia is present.   Musculoskeletal:         General: Normal range of motion.      Cervical back: Normal range of motion and neck supple. No rigidity or tenderness.      Right lower leg: No edema.      Left lower leg: No edema.   Lymphadenopathy:      Cervical: No cervical adenopathy.   Skin:     General: Skin is warm and dry.      Coloration: Skin is not jaundiced.   Neurological:      General: No focal deficit present.      Mental Status: He is alert and oriented to person, place, and time. Mental status is at baseline.      Sensory: No sensory deficit.      Motor: No weakness or abnormal muscle tone.      Coordination: Coordination normal.      Gait: Gait normal.      Deep Tendon Reflexes: Reflexes normal.   Psychiatric:         Mood and Affect: Mood normal.         Behavior: Behavior normal.         Thought Content: Thought content normal.          Judgment: Judgment normal.        Result Review :                Assessment and Plan   Diagnoses and all orders for this visit:    1. Arachnoiditis (Primary)  -     Ambulatory Referral to Neurosurgery  -     Ambulatory Referral to Pain Management    2. Spasm  -     baclofen 5 MG tablet; Take 5 mg by mouth 3 (Three) Times a Day.  Dispense: 30 tablet; Refill: 1    3. Bilateral low back pain without sciatica, unspecified chronicity  -     meloxicam (Mobic) 7.5 MG tablet; Take 1 tablet by mouth Daily As Needed for Moderate Pain.  Dispense: 30 tablet; Refill: 1    4. Low vitamin D level  -     CBC & Differential  -     Comprehensive Metabolic Panel  -     Lipid Panel  -     TSH  -     POC Urinalysis Dipstick, Automated  -     Vitamin D 25 hydroxy  -     Vitamin B12    5. High cholesterol  -     CBC & Differential  -     Comprehensive Metabolic Panel  -     Lipid Panel  -     TSH  -     POC Urinalysis Dipstick, Automated  -     Vitamin D 25 hydroxy  -     Vitamin B12    6. Psychosomatic disorder  Comments:  per Psych      Side effects discussed with patient in detail, all including but not limited to every single topic discussed          Follow Up   Return in about 3 months (around 3/8/2023), or if symptoms worsen or fail to improve.  Patient was given instructions and counseling regarding his condition or for health maintenance advice. Please see specific information pulled into the AVS if appropriate.

## 2022-12-09 ENCOUNTER — TELEPHONE (OUTPATIENT)
Dept: FAMILY MEDICINE CLINIC | Facility: CLINIC | Age: 27
End: 2022-12-09

## 2022-12-09 DIAGNOSIS — R74.01 ELEVATED ALANINE AMINOTRANSFERASE (ALT) LEVEL: ICD-10-CM

## 2022-12-09 DIAGNOSIS — R79.89 LOW VITAMIN D LEVEL: Primary | ICD-10-CM

## 2022-12-09 LAB
25(OH)D3+25(OH)D2 SERPL-MCNC: 25.5 NG/ML (ref 30–100)
ALBUMIN SERPL-MCNC: 4.4 G/DL (ref 3.5–5.2)
ALBUMIN/GLOB SERPL: 2.6 G/DL
ALP SERPL-CCNC: 63 U/L (ref 39–117)
ALT SERPL-CCNC: 101 U/L (ref 1–41)
AST SERPL-CCNC: 23 U/L (ref 1–40)
BASOPHILS # BLD AUTO: 0.01 10*3/MM3 (ref 0–0.2)
BASOPHILS NFR BLD AUTO: 0.1 % (ref 0–1.5)
BILIRUB SERPL-MCNC: 0.7 MG/DL (ref 0–1.2)
BUN SERPL-MCNC: 18 MG/DL (ref 6–20)
BUN/CREAT SERPL: 19.1 (ref 7–25)
CALCIUM SERPL-MCNC: 9 MG/DL (ref 8.6–10.5)
CHLORIDE SERPL-SCNC: 101 MMOL/L (ref 98–107)
CHOLEST SERPL-MCNC: 167 MG/DL (ref 0–200)
CO2 SERPL-SCNC: 32.2 MMOL/L (ref 22–29)
CREAT SERPL-MCNC: 0.94 MG/DL (ref 0.76–1.27)
EGFRCR SERPLBLD CKD-EPI 2021: 114.7 ML/MIN/1.73
EOSINOPHIL # BLD AUTO: 0.06 10*3/MM3 (ref 0–0.4)
EOSINOPHIL NFR BLD AUTO: 0.6 % (ref 0.3–6.2)
ERYTHROCYTE [DISTWIDTH] IN BLOOD BY AUTOMATED COUNT: 13.3 % (ref 12.3–15.4)
GLOBULIN SER CALC-MCNC: 1.7 GM/DL
GLUCOSE SERPL-MCNC: 86 MG/DL (ref 65–99)
HCT VFR BLD AUTO: 46.1 % (ref 37.5–51)
HDLC SERPL-MCNC: 58 MG/DL (ref 40–60)
HGB BLD-MCNC: 15.3 G/DL (ref 13–17.7)
IMM GRANULOCYTES # BLD AUTO: 0.15 10*3/MM3 (ref 0–0.05)
IMM GRANULOCYTES NFR BLD AUTO: 1.6 % (ref 0–0.5)
LDLC SERPL CALC-MCNC: 90 MG/DL (ref 0–100)
LYMPHOCYTES # BLD AUTO: 2.85 10*3/MM3 (ref 0.7–3.1)
LYMPHOCYTES NFR BLD AUTO: 29.9 % (ref 19.6–45.3)
MCH RBC QN AUTO: 28.9 PG (ref 26.6–33)
MCHC RBC AUTO-ENTMCNC: 33.2 G/DL (ref 31.5–35.7)
MCV RBC AUTO: 87 FL (ref 79–97)
MONOCYTES # BLD AUTO: 0.87 10*3/MM3 (ref 0.1–0.9)
MONOCYTES NFR BLD AUTO: 9.1 % (ref 5–12)
NEUTROPHILS # BLD AUTO: 5.58 10*3/MM3 (ref 1.7–7)
NEUTROPHILS NFR BLD AUTO: 58.7 % (ref 42.7–76)
NRBC BLD AUTO-RTO: 0 /100 WBC (ref 0–0.2)
PLATELET # BLD AUTO: 298 10*3/MM3 (ref 140–450)
POTASSIUM SERPL-SCNC: 4.6 MMOL/L (ref 3.5–5.2)
PROT SERPL-MCNC: 6.1 G/DL (ref 6–8.5)
RBC # BLD AUTO: 5.3 10*6/MM3 (ref 4.14–5.8)
SODIUM SERPL-SCNC: 142 MMOL/L (ref 136–145)
TRIGL SERPL-MCNC: 103 MG/DL (ref 0–150)
TSH SERPL DL<=0.005 MIU/L-ACNC: 2.98 UIU/ML (ref 0.27–4.2)
VIT B12 SERPL-MCNC: >2000 PG/ML (ref 211–946)
VLDLC SERPL CALC-MCNC: 19 MG/DL (ref 5–40)
WBC # BLD AUTO: 9.52 10*3/MM3 (ref 3.4–10.8)

## 2022-12-09 RX ORDER — ERGOCALCIFEROL 1.25 MG/1
50000 CAPSULE ORAL WEEKLY
Qty: 12 CAPSULE | Refills: 1 | Status: SHIPPED | OUTPATIENT
Start: 2022-12-09 | End: 2023-02-07

## 2022-12-10 DIAGNOSIS — R31.21 ASYMPTOMATIC MICROSCOPIC HEMATURIA: Primary | ICD-10-CM

## 2022-12-10 LAB
BACTERIA UR CULT: NO GROWTH
BACTERIA UR CULT: NORMAL

## 2022-12-13 ENCOUNTER — TELEPHONE (OUTPATIENT)
Dept: FAMILY MEDICINE CLINIC | Facility: CLINIC | Age: 27
End: 2022-12-13

## 2022-12-13 NOTE — TELEPHONE ENCOUNTER
OK FOR HUB    LMB with interpretor        elevated immature granulocytes, indicates possible recent inflammation or infection, ALT liver enzyme very high, please put him on lab schedule to check him for hepatitis panel, and also I will order liver ultrasound, vitamin D is low, vitamin D weekly sent, recheck in 6 weeks, cholesterol is normal

## 2022-12-14 ENCOUNTER — TELEPHONE (OUTPATIENT)
Dept: FAMILY MEDICINE CLINIC | Facility: CLINIC | Age: 27
End: 2022-12-14

## 2022-12-15 ENCOUNTER — TELEPHONE (OUTPATIENT)
Dept: FAMILY MEDICINE CLINIC | Facility: CLINIC | Age: 27
End: 2022-12-15

## 2022-12-15 NOTE — TELEPHONE ENCOUNTER
Pt Did call make an told me the reason for the elevated granulocytes is  because he got steroids recently and then he broke into a rash after that, he still has a rash on his chest and back, he forgot to mention on last appointment, he will try Benadryl for now

## 2023-01-25 DIAGNOSIS — R25.2 SPASM: ICD-10-CM

## 2023-01-25 RX ORDER — BACLOFEN 5 MG/1
5 TABLET ORAL 3 TIMES DAILY
Qty: 30 TABLET | Refills: 1 | Status: SHIPPED | OUTPATIENT
Start: 2023-01-25 | End: 2023-03-29 | Stop reason: ALTCHOICE

## 2023-01-26 DIAGNOSIS — M54.50 BILATERAL LOW BACK PAIN WITHOUT SCIATICA, UNSPECIFIED CHRONICITY: ICD-10-CM

## 2023-01-26 RX ORDER — MELOXICAM 7.5 MG/1
7.5 TABLET ORAL DAILY PRN
Qty: 30 TABLET | Refills: 2 | Status: SHIPPED | OUTPATIENT
Start: 2023-01-26 | End: 2023-03-29 | Stop reason: ALTCHOICE
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
## 2023-02-07 ENCOUNTER — OFFICE VISIT (OUTPATIENT)
Dept: PAIN MEDICINE | Facility: CLINIC | Age: 28
End: 2023-02-07
Payer: COMMERCIAL

## 2023-02-07 ENCOUNTER — PREP FOR SURGERY (OUTPATIENT)
Dept: SURGERY | Facility: SURGERY CENTER | Age: 28
End: 2023-02-07
Payer: COMMERCIAL

## 2023-02-07 VITALS
HEART RATE: 82 BPM | TEMPERATURE: 98.6 F | DIASTOLIC BLOOD PRESSURE: 73 MMHG | HEIGHT: 71 IN | SYSTOLIC BLOOD PRESSURE: 114 MMHG | WEIGHT: 153.6 LBS | OXYGEN SATURATION: 98 % | BODY MASS INDEX: 21.5 KG/M2

## 2023-02-07 DIAGNOSIS — M51.26 DISPLACEMENT OF LUMBAR INTERVERTEBRAL DISC WITHOUT MYELOPATHY: ICD-10-CM

## 2023-02-07 DIAGNOSIS — M54.16 LUMBAR RADICULITIS: Primary | ICD-10-CM

## 2023-02-07 PROCEDURE — 99204 OFFICE O/P NEW MOD 45 MIN: CPT | Performed by: ANESTHESIOLOGY

## 2023-02-07 RX ORDER — CLONAZEPAM 0.5 MG/1
0.5 TABLET ORAL DAILY
COMMUNITY
Start: 2023-01-31

## 2023-02-07 RX ORDER — DULOXETIN HYDROCHLORIDE 30 MG/1
CAPSULE, DELAYED RELEASE ORAL
COMMUNITY
Start: 2023-02-01

## 2023-02-07 RX ORDER — SODIUM CHLORIDE 0.9 % (FLUSH) 0.9 %
10 SYRINGE (ML) INJECTION EVERY 12 HOURS SCHEDULED
Status: CANCELLED | OUTPATIENT
Start: 2023-02-07

## 2023-02-07 RX ORDER — SODIUM CHLORIDE 0.9 % (FLUSH) 0.9 %
10 SYRINGE (ML) INJECTION AS NEEDED
Status: CANCELLED | OUTPATIENT
Start: 2023-02-07

## 2023-02-07 NOTE — PATIENT INSTRUCTIONS
What to expect if we're setting up an injection/procedure    - I have placed the order today, we'll start speaking to your insurance for authorization (this can sometimes take a few weeks).   - You should be scheduled for your procedure before you leave the office.  If you were not, please call our office to schedule.   - If you have any symptoms of an infection or of COVID, please reschedule your procedure.   - LIGHT Intravenous (IV) sedation is offered for some procedures: you will NOT be put to sleep.  If you plan to have sedation, do not eat or drink anything on the day of your injection.   - Most procedures require having someone drive you.  Please make sure you arrange a  unless told otherwise.   - If you take a blood thinner and you were not instructed whether to continue or hold it, please contact us with any questions.

## 2023-02-09 ENCOUNTER — TRANSCRIBE ORDERS (OUTPATIENT)
Dept: SURGERY | Facility: SURGERY CENTER | Age: 28
End: 2023-02-09
Payer: COMMERCIAL

## 2023-02-09 DIAGNOSIS — Z41.9 SURGERY, ELECTIVE: Primary | ICD-10-CM

## 2023-02-09 PROBLEM — M51.26 DISPLACEMENT OF LUMBAR INTERVERTEBRAL DISC WITHOUT MYELOPATHY: Status: ACTIVE | Noted: 2023-02-09

## 2023-02-09 PROBLEM — M54.16 LUMBAR RADICULITIS: Status: ACTIVE | Noted: 2023-02-09

## 2023-02-17 NOTE — SIGNIFICANT NOTE
Initial anticoagulation clinic note and most recent PCP note reviewed    Pending further recommendations, we will continue with indefinite anticoagulation for reported recurrent VTE in the setting of factor V Leiden    Will defer any indicated age appropriate screening for occult malignancy to pcp.    Michael Bloch, MD  Anticoagulation Clinic    Cc:  [facility]   Patient educated on the following :    - If you are receiving Sedation for your procedure Nothing to Eat 6 hours and only clear liquids for 2 hours prior to your procedure.    -You will need to have someone drive you home after your PROCEDURE and remain with you for 24 hours after the PROCEDURE  - The date of your procedure, your are welcome to have one visitor at bedside or remain within 10-15 minutes of [facility]  -You will need to arrive at 1100 on 2-20-23 PROCEDURE  -Please contact [facility] at: [phone] with any questions and/or concerns

## 2023-02-17 NOTE — DISCHARGE INSTRUCTIONS
[facility] - Post-procedure Instructions          --  While there are no absolute restrictions, it is recommended that you do not perform strenuous activity today. In the morning, you may resume your level of activity as before your block.    --  If you have a band-aid at your injection site, please remove it later today. Observe the area for any redness, swelling, pus-like drainage, or a temperature over 101°. If any of these symptoms occur, please call your doctor at [phone]. If after office hours, leave a message and the on-call provider will return your call.    --  Ice may be applied to your injection site. It is recommended you avoid direct heat (heating pad; hot tub) for 1-2 days.    --  Call [facility] at [phone] if you experience persistent headache, persistent bleeding from the injection site, or severe pain not relieved by heat or oral medication.    --  Do not make important decisions today.    --  Due to the effects of the block and/or the I.V. Sedation, DO NOT drive or operate hazardous machinery for 12 hours.  Local anesthetics may cause numbness after procedure and precautions must be taken with regards to operating equipment as well as with walking, even if ambulating with assistance of another person or with an assistive device.    --  Do not drink alcohol for 12 hours.    -- You may return to work tomorrow, or as directed by your referring doctor.    --  Occasionally you may notice a slight increase in your pain after the procedure. This should start to improve within the next 24-48 hours. Radiofrequency ablation procedure pain may last 3-4 weeks.    --  It may take as long as 3-4 days before you notice a gradual improvement in your pain and/or other symptoms.    -- You may continue to take your prescribed pain medication as needed.    --  Some normal possible side effects of steroid use could include fluid retention, increased blood sugar, dull headache,  increased sweating, increased appetite, mood swings and flushing.    --  Diabetics are recommended to watch their blood glucose level closely for 24-48 hours after the injection.    --  Must stay in PACU for 20 min upon arrival and prove no leg weakness before being discharged.    --  IN THE EVENT OF A LIFE THREATENING EMERGENCY, (CHEST PAIN, BREATHING DIFFICULTIES, PARALYSIS…) YOU SHOULD GO TO YOUR NEAREST EMERGENCY ROOM.    --  You should be contacted by our office within 2-3 days to schedule follow up or next appointment date.  If not contacted within 7 days, please call the office at [phone]

## 2023-02-20 ENCOUNTER — HOSPITAL ENCOUNTER (OUTPATIENT)
Dept: GENERAL RADIOLOGY | Facility: SURGERY CENTER | Age: 28
Setting detail: HOSPITAL OUTPATIENT SURGERY
End: 2023-02-20
Payer: COMMERCIAL

## 2023-02-20 ENCOUNTER — HOSPITAL ENCOUNTER (OUTPATIENT)
Facility: SURGERY CENTER | Age: 28
Setting detail: HOSPITAL OUTPATIENT SURGERY
Discharge: HOME | End: 2023-02-20
Attending: ANESTHESIOLOGY | Admitting: ANESTHESIOLOGY
Payer: COMMERCIAL

## 2023-02-20 VITALS
HEART RATE: 81 BPM | WEIGHT: 165 LBS | HEIGHT: 71 IN | BODY MASS INDEX: 23.1 KG/M2 | RESPIRATION RATE: 20 BRPM | TEMPERATURE: 99.3 F | DIASTOLIC BLOOD PRESSURE: 77 MMHG | OXYGEN SATURATION: 95 % | SYSTOLIC BLOOD PRESSURE: 122 MMHG

## 2023-02-20 DIAGNOSIS — M51.26 DISPLACEMENT OF LUMBAR INTERVERTEBRAL DISC WITHOUT MYELOPATHY: ICD-10-CM

## 2023-02-20 DIAGNOSIS — M54.16 LUMBAR RADICULITIS: ICD-10-CM

## 2023-02-20 DIAGNOSIS — Z41.9 SURGERY, ELECTIVE: ICD-10-CM

## 2023-02-20 PROCEDURE — 25010000002 IOPAMIDOL 61 % SOLUTION 30 ML VIAL: Performed by: ANESTHESIOLOGY

## 2023-02-20 PROCEDURE — 76000 FLUOROSCOPY <1 HR PHYS/QHP: CPT

## 2023-02-20 PROCEDURE — 25010000002 DEXAMETHASONE SODIUM PHOSPHATE 100 MG/10ML SOLUTION 10 ML VIAL: Performed by: ANESTHESIOLOGY

## 2023-02-20 PROCEDURE — 62323 NJX INTERLAMINAR LMBR/SAC: CPT | Performed by: ANESTHESIOLOGY

## 2023-02-20 PROCEDURE — 77002 NEEDLE LOCALIZATION BY XRAY: CPT

## 2023-02-20 PROCEDURE — 25010000002 FENTANYL CITRATE (PF) 50 MCG/ML SOLUTION: Performed by: ANESTHESIOLOGY

## 2023-02-20 PROCEDURE — 25010000002 MIDAZOLAM PER 1 MG: Performed by: ANESTHESIOLOGY

## 2023-02-20 RX ORDER — SODIUM CHLORIDE 0.9 % (FLUSH) 0.9 %
10 SYRINGE (ML) INJECTION AS NEEDED
Status: DISCONTINUED | OUTPATIENT
Start: 2023-02-20 | End: 2023-02-20 | Stop reason: HOSPADM

## 2023-02-20 RX ORDER — FENTANYL CITRATE 50 UG/ML
INJECTION, SOLUTION INTRAMUSCULAR; INTRAVENOUS AS NEEDED
Status: DISCONTINUED | OUTPATIENT
Start: 2023-02-20 | End: 2023-02-20 | Stop reason: HOSPADM

## 2023-02-20 RX ORDER — SODIUM CHLORIDE 0.9 % (FLUSH) 0.9 %
10 SYRINGE (ML) INJECTION EVERY 12 HOURS SCHEDULED
Status: DISCONTINUED | OUTPATIENT
Start: 2023-02-20 | End: 2023-02-20 | Stop reason: HOSPADM

## 2023-02-20 RX ORDER — MIDAZOLAM HYDROCHLORIDE 1 MG/ML
INJECTION INTRAMUSCULAR; INTRAVENOUS AS NEEDED
Status: DISCONTINUED | OUTPATIENT
Start: 2023-02-20 | End: 2023-02-20 | Stop reason: HOSPADM

## 2023-02-20 NOTE — OP NOTE
L5/S1 Interlaminar Lumbar Epidural Steroid Injection   [facility]    PREOPERATIVE DIAGNOSIS:   Lumbar radiculitis  POSTOPERATIVE DIAGNOSIS:  Same as preop diagnosis    PROCEDURE:   Lumbar Epidural Steroid Injection, Therapeutic Interlaminar Injection, with epidurogram, at  L5/S1 level    PRE-PROCEDURE DISCUSSION WITH PATIENT:    Risks and complications were discussed with the patient prior to starting the procedure and informed consent was obtained.  We discussed various topics including but not limited to bleeding, infection, injury, paralysis, nerve injury, dural puncture, coma, death, worsening of clinical picture, lack of pain relief, and postprocedural soreness.    SURGEON:  Janay Gaming MD    REASON FOR PROCEDURE:    Diagnostic injection at this level is needed    SEDATION:  Light sedation was used for this procedure which included and Versed 2mg & Fentanyl 50mcg  ANESTHETIC:  Marcaine 0.5%  STEROID:   15mg dexamethasone    DESCRIPTON OF PROCEDURE:    After obtaining informed consent, I.V. was started in the preop area.   The patient was taken to the operating room and placed in the prone position.  EKG, blood pressure, and pulse oximeter were monitored throughout, and sedation was provided as needed by the RN under my guidance. All pressure points were well padded.  The lumbar spine area was prepped with Chloraprep and draped in a sterile fashion.  Mr. Geremias Osorio confirmed he did not want to use an  during his procedure.    AP fluoroscopic image was used to visualize the L5/S1 interspace.  The skin and subcutaneous tissue over the area was anesthetized with 1% Lidocaine.  An 18-Gauge Tuohy needle was then advanced through the anesthetized skin tract under fluoroscopic guidance in a coaxial view using a loss of resistance technique.  Lateral fluoroscopy was used to verify appropriate needle depth.  Once the needle tip was felt to be in the posterior epidural space,  aspiration was noted to be negative for blood or CSF.  A volume of 3mL of Isovue was then injected under live fluoroscopy in an AP view which produced good epidural spread with no evidence of loculation, vascular run-off or intrathecal spread.  Subsequently, a total volume of 5mL consisting of 15mg of dexamethasone, 0.5mL of 0.5% bupivcacaine and normal saline was injected without resistance.  The needle was removed intact.     ESTIMATED BLOOD LOSS:  <5 mL  SPECIMENS:  None    COMPLICATIONS:     No complications were noted., There was no indication of vascular uptake on live injection of contrast dye. and There was no indication of intrathecal uptake on live injection of contrast dye.    TOLERANCE & DISCHARGE CONDITION:    The patient tolerated the procedure well.  The patient was transported to the recovery area without difficulties.  The patient was discharged to home under the care of family in stable and satisfactory condition.    PLAN OF CARE:  1. The patient was given our standard instruction sheet.  2. The patient will Return to clinic 4-6 wks  3. The patient will resume all medications as per the medication reconciliation sheet.

## 2023-02-23 ENCOUNTER — TELEPHONE (OUTPATIENT)
Dept: PAIN MEDICINE | Facility: CLINIC | Age: 28
End: 2023-02-23

## 2023-02-23 NOTE — TELEPHONE ENCOUNTER
I recommend giving this injection more time to work.  We'll re-evaluate at his appointment on 3/20.

## 2023-02-23 NOTE — TELEPHONE ENCOUNTER
Thanks.  As explained before, everything in the spine is connected.  The medication does travel higher (and lower) in the spine than where the injection was performed.  This injection is not meant to treat pin-point pain, it's to treat an area on his MRI that shows pathology that could also cause his symptoms.  We will re-evaluate at his next appointment.

## 2023-02-23 NOTE — TELEPHONE ENCOUNTER
Patient understood but also states where he had procedure done LUMBAR EPIDURAL 1ST VISIT L5-S1 that's not where he experiences chronic pain he is saying its slightly localized higher than where procedure performed .

## 2023-03-29 ENCOUNTER — PREP FOR SURGERY (OUTPATIENT)
Dept: SURGERY | Facility: SURGERY CENTER | Age: 28
End: 2023-03-29
Payer: COMMERCIAL

## 2023-03-29 ENCOUNTER — OFFICE VISIT (OUTPATIENT)
Dept: PAIN MEDICINE | Facility: CLINIC | Age: 28
End: 2023-03-29
Payer: COMMERCIAL

## 2023-03-29 VITALS
OXYGEN SATURATION: 98 % | TEMPERATURE: 98.9 F | WEIGHT: 147.2 LBS | HEART RATE: 89 BPM | HEIGHT: 71 IN | SYSTOLIC BLOOD PRESSURE: 116 MMHG | BODY MASS INDEX: 20.61 KG/M2 | DIASTOLIC BLOOD PRESSURE: 68 MMHG

## 2023-03-29 DIAGNOSIS — M51.26 DISPLACEMENT OF LUMBAR INTERVERTEBRAL DISC WITHOUT MYELOPATHY: ICD-10-CM

## 2023-03-29 DIAGNOSIS — M54.16 LUMBAR RADICULITIS: Primary | ICD-10-CM

## 2023-03-29 PROCEDURE — 1160F RVW MEDS BY RX/DR IN RCRD: CPT | Performed by: PHYSICIAN ASSISTANT

## 2023-03-29 PROCEDURE — 1125F AMNT PAIN NOTED PAIN PRSNT: CPT | Performed by: PHYSICIAN ASSISTANT

## 2023-03-29 PROCEDURE — 1159F MED LIST DOCD IN RCRD: CPT | Performed by: PHYSICIAN ASSISTANT

## 2023-03-29 PROCEDURE — 99214 OFFICE O/P EST MOD 30 MIN: CPT | Performed by: PHYSICIAN ASSISTANT

## 2023-03-29 RX ORDER — SODIUM CHLORIDE 0.9 % (FLUSH) 0.9 %
10 SYRINGE (ML) INJECTION EVERY 12 HOURS SCHEDULED
OUTPATIENT
Start: 2023-03-29

## 2023-03-29 RX ORDER — SODIUM CHLORIDE 0.9 % (FLUSH) 0.9 %
10 SYRINGE (ML) INJECTION AS NEEDED
OUTPATIENT
Start: 2023-03-29

## 2023-03-29 NOTE — H&P (VIEW-ONLY)
CHIEF COMPLAINT    Procedure follow up      Subjective   Kathleen Osorio is a 27 y.o. male  who presents to the office for follow-up of procedure.  He completed a LUMBAR EPIDURAL 1ST VISIT L5-S1   on  2023 performed by Dr. Gaming for management of back pain. Patient reports 0% relief from the procedure.  Patient continues with report of pain that originates in the midline region of the lumbar spine at a level that he feels is higher than where the previous injection was performed.  Denies any lower extremity radicular symptoms, numbness, tingling or weakness.    Pain is reported at 10 out of 10 despite education being provided        Back Pain  This is a chronic problem. The current episode started more than 1 year ago. The problem occurs constantly. The problem is unchanged. The pain is present in the lumbar spine. The quality of the pain is described as aching and burning. The pain does not radiate. The pain is at a severity of 10/10. The pain is severe. The pain is the same all the time. The symptoms are aggravated by position, standing and bending. Pertinent negatives include no abdominal pain, dysuria, fever, headaches, numbness or weakness.        PEG Assessment   What number best describes your pain on average in the past week?9  What number best describes how, during the past week, pain has interfered with your enjoyment of life?8  What number best describes how, during the past week, pain has interfered with your general activity?  8    Review of Pertinent Medical Data ---    RADIOLOGY REPORT     FACILITY:  [facility]   UNIT/AGE/GENDER: D.MRI  OP      AGE:26 Y          SEX:M   PATIENT NAME/:  KATHLEEN HIGH    1995   UNIT NUMBER:  [id number]   ACCOUNT NUMBER:  [id number]   ACCESSION NUMBER:  [id number]     EXAMINATION: Magnetic resonance imaging (MRI) of the lumbar spine with and without contrast contrast     HISTORY: 26-year-old male with chronic low back  pain, paresthesias. Evaluate for tethered cord     TECHNIQUE: Multiplanar multi-weighted MRI of the lumbar spine was performed with and without intravenous contrast using the standard lumbar spine protocol.     Contrast information: 13.4 mL of Clariscan     COMPARISON: CT of the lumbar spine 12/24/2017     FINDINGS:     The alignment of the lumbar spine is normal.  There are no vertebral segmentation anomalies.     No suspicious marrow replacing lesions.  No acute compression fractures.     The conus medullaris terminates at the level of T12-L1.  The distal spinal cord signal intensity is normal.  No evidence for filum terminale lipoma.  Mild disc space narrowing associated with degenerative disc changes at L4-L5 and L5-S1.       Level by level findings:     L1-L2: Negative     L2-L3: Negative     L3-L4: Negative     L4-L5: Disc bulge with subtle central disc protrusion. Mild lateral recess stenosis without contact of the traversing nerve roots. There is mild bilateral foraminal narrowing.     L5-S1: Disc bulge with posterior annular fissuring and a subtle central disc protrusion without associated central canal narrowing. There is no foraminal narrowing.     IMPRESSION:     No acute lumbar spine abnormalities.  No MRI findings to suggest tethered cord.     Mild degenerative disc changes at L4-L5 and L5-S1 as detailed above. No moderate or high-grade central canal or foraminal stenosis in the lumbar region.     Dictated by: Pa Edmond M.D.     Images and Report reviewed and interpreted by: Pa Edmond M.D.     <PS><Electronically signed by: Pa Edmond M.D.>   06/22/2022 9551     The following portions of the patient's history were reviewed and updated as appropriate: allergies, current medications, past family history, past medical history, past social history, past surgical history and problem list.    Review of Systems   Constitutional: Negative for chills, fatigue and fever.   Respiratory: Negative for  "cough and shortness of breath.    Gastrointestinal: Negative for abdominal pain, constipation and diarrhea.   Genitourinary: Positive for frequency. Negative for difficulty urinating and dysuria.   Musculoskeletal: Positive for back pain.   Neurological: Negative for dizziness, weakness, numbness and headaches.   Psychiatric/Behavioral: Positive for sleep disturbance.     I have reviewed and confirmed the accuracy of the ROS as documented by the MA/LPN/RN NELLIE Orona    Vitals:    03/29/23 1451   BP: 116/68   BP Location: Left arm   Patient Position: Sitting   Pulse: 89   Temp: 98.9 °F (37.2 °C)   TempSrc: Temporal   SpO2: 98%   Weight: 66.8 kg (147 lb 3.2 oz)   Height: 180.3 cm (71\")   PainSc: 10-Worst pain ever   PainLoc: Back         Objective   Physical Exam  Vitals and nursing note reviewed. Chaperone present: INTERPRETIVE SERVICES PROVIDED VIA [facility].   Constitutional:       Appearance: Normal appearance. He is normal weight.   HENT:      Head: Normocephalic.   Pulmonary:      Effort: Pulmonary effort is normal.   Musculoskeletal:      Lumbar back: Spasms and tenderness present. Decreased range of motion.        Back:    Skin:     General: Skin is warm and dry.   Neurological:      General: No focal deficit present.      Mental Status: He is alert and oriented to person, place, and time.      Cranial Nerves: Cranial nerves 2-12 are intact.      Sensory: Sensation is intact.      Motor: Motor function is intact.      Gait: Gait is intact.   Psychiatric:         Mood and Affect: Mood normal.         Behavior: Behavior normal.         Thought Content: Thought content normal.         Judgment: Judgment normal.         Assessment & Plan   Diagnoses and all orders for this visit:    1. Lumbar radiculitis (Primary)    2. Displacement of lumbar intervertebral disc without myelopathy        --- Based on failure to respond to L5-S1 LESI I recommend a change of level I will have the patient return for L4-5 " MALIK under fluoroscopy guidance with Dr. Gaming.  The risk and benefits of the procedure once again been reviewed with the patient and all of his questions addressed via the  services.  --- RTC in 4 weeks after injective therapy              Dictated utilizing Dragon dictation.

## 2023-03-29 NOTE — PROGRESS NOTES
CHIEF COMPLAINT    Procedure follow up      Subjective   Kathleen Osorio is a 27 y.o. male  who presents to the office for follow-up of procedure.  He completed a LUMBAR EPIDURAL 1ST VISIT L5-S1   on  2023 performed by Dr. Gaming for management of back pain. Patient reports 0% relief from the procedure.  Patient continues with report of pain that originates in the midline region of the lumbar spine at a level that he feels is higher than where the previous injection was performed.  Denies any lower extremity radicular symptoms, numbness, tingling or weakness.    Pain is reported at 10 out of 10 despite education being provided        Back Pain  This is a chronic problem. The current episode started more than 1 year ago. The problem occurs constantly. The problem is unchanged. The pain is present in the lumbar spine. The quality of the pain is described as aching and burning. The pain does not radiate. The pain is at a severity of 10/10. The pain is severe. The pain is the same all the time. The symptoms are aggravated by position, standing and bending. Pertinent negatives include no abdominal pain, dysuria, fever, headaches, numbness or weakness.        PEG Assessment   What number best describes your pain on average in the past week?9  What number best describes how, during the past week, pain has interfered with your enjoyment of life?8  What number best describes how, during the past week, pain has interfered with your general activity?  8    Review of Pertinent Medical Data ---    RADIOLOGY REPORT     FACILITY:  [facility]   UNIT/AGE/GENDER: D.MRI  OP      AGE:26 Y          SEX:M   PATIENT NAME/:  KATHLEEN HIGH    1995   UNIT NUMBER:  [id number]   ACCOUNT NUMBER:  [id number]   ACCESSION NUMBER:  [id number]     EXAMINATION: Magnetic resonance imaging (MRI) of the lumbar spine with and without contrast contrast     HISTORY: 26-year-old male with chronic low back  pain, paresthesias. Evaluate for tethered cord     TECHNIQUE: Multiplanar multi-weighted MRI of the lumbar spine was performed with and without intravenous contrast using the standard lumbar spine protocol.     Contrast information: 13.4 mL of Clariscan     COMPARISON: CT of the lumbar spine 12/24/2017     FINDINGS:     The alignment of the lumbar spine is normal.  There are no vertebral segmentation anomalies.     No suspicious marrow replacing lesions.  No acute compression fractures.     The conus medullaris terminates at the level of T12-L1.  The distal spinal cord signal intensity is normal.  No evidence for filum terminale lipoma.  Mild disc space narrowing associated with degenerative disc changes at L4-L5 and L5-S1.       Level by level findings:     L1-L2: Negative     L2-L3: Negative     L3-L4: Negative     L4-L5: Disc bulge with subtle central disc protrusion. Mild lateral recess stenosis without contact of the traversing nerve roots. There is mild bilateral foraminal narrowing.     L5-S1: Disc bulge with posterior annular fissuring and a subtle central disc protrusion without associated central canal narrowing. There is no foraminal narrowing.     IMPRESSION:     No acute lumbar spine abnormalities.  No MRI findings to suggest tethered cord.     Mild degenerative disc changes at L4-L5 and L5-S1 as detailed above. No moderate or high-grade central canal or foraminal stenosis in the lumbar region.     Dictated by: Pa Edmond M.D.     Images and Report reviewed and interpreted by: Pa Edmond M.D.     <PS><Electronically signed by: Pa Edmond M.D.>   06/22/2022 6683     The following portions of the patient's history were reviewed and updated as appropriate: allergies, current medications, past family history, past medical history, past social history, past surgical history and problem list.    Review of Systems   Constitutional: Negative for chills, fatigue and fever.   Respiratory: Negative for  "cough and shortness of breath.    Gastrointestinal: Negative for abdominal pain, constipation and diarrhea.   Genitourinary: Positive for frequency. Negative for difficulty urinating and dysuria.   Musculoskeletal: Positive for back pain.   Neurological: Negative for dizziness, weakness, numbness and headaches.   Psychiatric/Behavioral: Positive for sleep disturbance.     I have reviewed and confirmed the accuracy of the ROS as documented by the MA/LPN/RN NELLIE Orona    Vitals:    03/29/23 1451   BP: 116/68   BP Location: Left arm   Patient Position: Sitting   Pulse: 89   Temp: 98.9 °F (37.2 °C)   TempSrc: Temporal   SpO2: 98%   Weight: 66.8 kg (147 lb 3.2 oz)   Height: 180.3 cm (71\")   PainSc: 10-Worst pain ever   PainLoc: Back         Objective   Physical Exam  Vitals and nursing note reviewed. Chaperone present: INTERPRETIVE SERVICES PROVIDED VIA [facility].   Constitutional:       Appearance: Normal appearance. He is normal weight.   HENT:      Head: Normocephalic.   Pulmonary:      Effort: Pulmonary effort is normal.   Musculoskeletal:      Lumbar back: Spasms and tenderness present. Decreased range of motion.        Back:    Skin:     General: Skin is warm and dry.   Neurological:      General: No focal deficit present.      Mental Status: He is alert and oriented to person, place, and time.      Cranial Nerves: Cranial nerves 2-12 are intact.      Sensory: Sensation is intact.      Motor: Motor function is intact.      Gait: Gait is intact.   Psychiatric:         Mood and Affect: Mood normal.         Behavior: Behavior normal.         Thought Content: Thought content normal.         Judgment: Judgment normal.         Assessment & Plan   Diagnoses and all orders for this visit:    1. Lumbar radiculitis (Primary)    2. Displacement of lumbar intervertebral disc without myelopathy        --- Based on failure to respond to L5-S1 LESI I recommend a change of level I will have the patient return for L4-5 " MALIK under fluoroscopy guidance with Dr. Gaming.  The risk and benefits of the procedure once again been reviewed with the patient and all of his questions addressed via the  services.  --- RTC in 4 weeks after injective therapy              Dictated utilizing Dragon dictation.

## 2023-03-30 ENCOUNTER — TRANSCRIBE ORDERS (OUTPATIENT)
Dept: SURGERY | Facility: SURGERY CENTER | Age: 28
End: 2023-03-30
Payer: COMMERCIAL

## 2023-03-30 DIAGNOSIS — Z41.9 SURGERY, ELECTIVE: Primary | ICD-10-CM

## 2023-04-26 NOTE — DISCHARGE INSTRUCTIONS
[facility] - Post-procedure Instructions          --  While there are no absolute restrictions, it is recommended that you do not perform strenuous activity today. In the morning, you may resume your level of activity as before your block.    --  If you have a band-aid at your injection site, please remove it later today. Observe the area for any redness, swelling, pus-like drainage, or a temperature over 101°. If any of these symptoms occur, please call your doctor at [phone]. If after office hours, leave a message and the on-call provider will return your call.    --  Ice may be applied to your injection site. It is recommended you avoid direct heat (heating pad; hot tub) for 1-2 days.    --  Call [facility] at [phone] if you experience persistent headache, persistent bleeding from the injection site, or severe pain not relieved by heat or oral medication.    --  Do not make important decisions today.    --  Due to the effects of the block and/or the I.V. Sedation, DO NOT drive or operate hazardous machinery for 12 hours.  Local anesthetics may cause numbness after procedure and precautions must be taken with regards to operating equipment as well as with walking, even if ambulating with assistance of another person or with an assistive device.    --  Do not drink alcohol for 12 hours.    -- You may return to work tomorrow, or as directed by your referring doctor.    --  Occasionally you may notice a slight increase in your pain after the procedure. This should start to improve within the next 24-48 hours. Radiofrequency ablation procedure pain may last 3-4 weeks.    --  It may take as long as 3-4 days before you notice a gradual improvement in your pain and/or other symptoms.    -- You may continue to take your prescribed pain medication as needed.    --  Some normal possible side effects of steroid use could include fluid retention, increased blood sugar, dull headache,  increased sweating, increased appetite, mood swings and flushing.    --  Diabetics are recommended to watch their blood glucose level closely for 24-48 hours after the injection.    --  Must stay in PACU for 20 min upon arrival and prove no leg weakness before being discharged.    --  IN THE EVENT OF A LIFE THREATENING EMERGENCY, (CHEST PAIN, BREATHING DIFFICULTIES, PARALYSIS…) YOU SHOULD GO TO YOUR NEAREST EMERGENCY ROOM.    --  You should be contacted by our office within 2-3 days to schedule follow up or next appointment date.  If not contacted within 7 days, please call the office at [phone]

## 2023-04-28 ENCOUNTER — HOSPITAL ENCOUNTER (OUTPATIENT)
Facility: SURGERY CENTER | Age: 28
Setting detail: HOSPITAL OUTPATIENT SURGERY
Discharge: HOME | End: 2023-04-28
Attending: ANESTHESIOLOGY | Admitting: ANESTHESIOLOGY
Payer: COMMERCIAL

## 2023-04-28 ENCOUNTER — HOSPITAL ENCOUNTER (OUTPATIENT)
Dept: GENERAL RADIOLOGY | Facility: SURGERY CENTER | Age: 28
Setting detail: HOSPITAL OUTPATIENT SURGERY
End: 2023-04-28
Payer: COMMERCIAL

## 2023-04-28 VITALS
BODY MASS INDEX: 29.27 KG/M2 | TEMPERATURE: 98.2 F | WEIGHT: 155 LBS | SYSTOLIC BLOOD PRESSURE: 108 MMHG | DIASTOLIC BLOOD PRESSURE: 73 MMHG | RESPIRATION RATE: 16 BRPM | HEART RATE: 74 BPM | HEIGHT: 61 IN | OXYGEN SATURATION: 97 %

## 2023-04-28 DIAGNOSIS — Z41.9 SURGERY, ELECTIVE: ICD-10-CM

## 2023-04-28 PROCEDURE — 62323 NJX INTERLAMINAR LMBR/SAC: CPT | Performed by: ANESTHESIOLOGY

## 2023-04-28 PROCEDURE — 25010000002 DEXAMETHASONE SODIUM PHOSPHATE 100 MG/10ML SOLUTION 10 ML VIAL: Performed by: ANESTHESIOLOGY

## 2023-04-28 PROCEDURE — 77002 NEEDLE LOCALIZATION BY XRAY: CPT

## 2023-04-28 PROCEDURE — 25010000002 MIDAZOLAM PER 1 MG: Performed by: ANESTHESIOLOGY

## 2023-04-28 PROCEDURE — 76000 FLUOROSCOPY <1 HR PHYS/QHP: CPT

## 2023-04-28 PROCEDURE — 25010000002 FENTANYL CITRATE (PF) 50 MCG/ML SOLUTION: Performed by: ANESTHESIOLOGY

## 2023-04-28 PROCEDURE — 25510000001 IOPAMIDOL 61 % SOLUTION 30 ML VIAL: Performed by: ANESTHESIOLOGY

## 2023-04-28 RX ORDER — SODIUM CHLORIDE 0.9 % (FLUSH) 0.9 %
10 SYRINGE (ML) INJECTION EVERY 12 HOURS SCHEDULED
Status: DISCONTINUED | OUTPATIENT
Start: 2023-04-28 | End: 2023-04-28 | Stop reason: HOSPADM

## 2023-04-28 RX ORDER — FENTANYL CITRATE 50 UG/ML
INJECTION, SOLUTION INTRAMUSCULAR; INTRAVENOUS AS NEEDED
Status: DISCONTINUED | OUTPATIENT
Start: 2023-04-28 | End: 2023-04-28 | Stop reason: HOSPADM

## 2023-04-28 RX ORDER — SODIUM CHLORIDE 0.9 % (FLUSH) 0.9 %
10 SYRINGE (ML) INJECTION AS NEEDED
Status: DISCONTINUED | OUTPATIENT
Start: 2023-04-28 | End: 2023-04-28 | Stop reason: HOSPADM

## 2023-04-28 RX ORDER — MIDAZOLAM HYDROCHLORIDE 1 MG/ML
INJECTION INTRAMUSCULAR; INTRAVENOUS AS NEEDED
Status: DISCONTINUED | OUTPATIENT
Start: 2023-04-28 | End: 2023-04-28 | Stop reason: HOSPADM

## 2023-04-28 NOTE — OP NOTE
L3/L4 Interlaminar Lumbar Epidural Steroid Injection (Painful level confirmed under fluoro)  [facility]    PREOPERATIVE DIAGNOSIS:   Lumbar Disc Displacement and Lumbar radiculitis  POSTOPERATIVE DIAGNOSIS:  Same as preop diagnosis    PROCEDURE:   Lumbar Epidural Steroid Injection, Therapeutic Interlaminar Injection, with epidurogram, at  L3/L4 level    PRE-PROCEDURE DISCUSSION WITH PATIENT:    Risks and complications were discussed with the patient prior to starting the procedure and informed consent was obtained.  We discussed various topics including but not limited to bleeding, infection, injury, paralysis, nerve injury, dural puncture, coma, death, worsening of clinical picture, lack of pain relief, and postprocedural soreness.    SURGEON:  Janay Gaming MD    REASON FOR PROCEDURE:    Diagnostic injection at this level is needed    SEDATION:  Light sedation was used for this procedure which included and Versed 2mg & Fentanyl 50mcg  ANESTHETIC:  Marcaine 0.5%  STEROID:   15mg dexamethasone    DESCRIPTON OF PROCEDURE:    After obtaining informed consent, I.V. was started in the preop area.   The patient was taken to the operating room and placed in the prone position.  EKG, blood pressure, and pulse oximeter were monitored throughout, and sedation was provided as needed by the RN under my guidance. All pressure points were well padded.  The lumbar spine area was prepped with Chloraprep and draped in a sterile fashion.      AP fluoroscopic image was used to visualize the L3/L4 interspace.  The skin and subcutaneous tissue over the area was anesthetized with 1% Lidocaine.  An 18-Gauge Tuohy needle was then advanced through the anesthetized skin tract under fluoroscopic guidance in a coaxial view using a loss of resistance technique.  Lateral fluoroscopy was used to verify appropriate needle depth.  Once the needle tip was felt to be in the posterior epidural space, aspiration was noted to be  negative for blood or CSF.  A volume of 1mL of Isovue was then injected under live fluoroscopy in an AP view which produced good epidural spread with no evidence of loculation, vascular run-off or intrathecal spread.  Subsequently, a total volume of 5mL consisting of 15mg of dexamethasone, 0.5mL of 0.5% bupivcacaine and normal saline was injected without resistance.  The needle was removed intact.     ESTIMATED BLOOD LOSS:  <5 mL  SPECIMENS:  None    COMPLICATIONS:     No complications were noted., There was no indication of vascular uptake on live injection of contrast dye. and There was no indication of intrathecal uptake on live injection of contrast dye.    TOLERANCE & DISCHARGE CONDITION:    The patient tolerated the procedure well.  The patient was transported to the recovery area without difficulties.  The patient was discharged to home under the care of family in stable and satisfactory condition.    PLAN OF CARE:  1. The patient was given our standard instruction sheet.  2. The patient will Return to clinic 4-6 wks  3. The patient will resume all medications as per the medication reconciliation sheet.

## 2023-05-25 ENCOUNTER — OFFICE VISIT (OUTPATIENT)
Dept: PAIN MEDICINE | Facility: CLINIC | Age: 28
End: 2023-05-25
Payer: COMMERCIAL

## 2023-05-25 VITALS
SYSTOLIC BLOOD PRESSURE: 107 MMHG | DIASTOLIC BLOOD PRESSURE: 58 MMHG | HEART RATE: 80 BPM | WEIGHT: 147 LBS | BODY MASS INDEX: 20.58 KG/M2 | HEIGHT: 71 IN | TEMPERATURE: 97.3 F | OXYGEN SATURATION: 96 %

## 2023-05-25 DIAGNOSIS — M62.838 MUSCLE SPASM: ICD-10-CM

## 2023-05-25 DIAGNOSIS — M54.16 LUMBAR RADICULITIS: Primary | ICD-10-CM

## 2023-05-25 DIAGNOSIS — M54.50 BILATERAL LOW BACK PAIN WITHOUT SCIATICA, UNSPECIFIED CHRONICITY: ICD-10-CM

## 2023-05-25 DIAGNOSIS — F45.9 PSYCHOSOMATIC DISORDER: ICD-10-CM

## 2023-05-25 DIAGNOSIS — M51.26 DISPLACEMENT OF LUMBAR INTERVERTEBRAL DISC WITHOUT MYELOPATHY: ICD-10-CM

## 2023-05-25 RX ORDER — BACLOFEN 10 MG/1
10 TABLET ORAL 3 TIMES DAILY
Qty: 90 TABLET | Refills: 2 | Status: SHIPPED | OUTPATIENT
Start: 2023-05-25

## 2023-05-25 RX ORDER — BACLOFEN 5 MG/1
1 TABLET ORAL 3 TIMES DAILY
COMMUNITY
Start: 2023-05-03 | End: 2023-05-25

## 2023-05-25 NOTE — PROGRESS NOTES
CHIEF COMPLAINT  F/U back pain- INTRALAMINAR LUMBAR EPIDURAL STEROID INJECTION L3/L4- patient states that he had no improvement.       Subjective   Brian Osorio is a 27 y.o. male  who presents to the office for follow-up of procedure.  This visit is performed today with the assistance of interpretive services. He completed a L3/L4 LESI   on 4/28/2023 performed by Dr. Gaming for management of low back pain. Patient reports 0% relief from the procedure.  Patient continues to illustrate pain that originates in the midline region the lumbar spine and extends in a bandlike distribution across the lumbosacral region.  Denies lower extremity pain, numbness, tingling or weakness.  Also denies bladder or bowel dysfunction.    This patient has not undergone L5/S1 LESI and L3/L4 LESI with suboptimal relief.  The patient has also had surgical opinions rendered while living in [place] is also provided at [facility] both with recommendation to avoid operative intervention.  Patient states that he would like another surgical evaluation.    He attempted a previous trial of physical therapy stating he discontinued sessions as he found that some of the maneuvers were aggravating his pain.    He is currently taking baclofen 10 mg p.o. 3 times daily which she does feel is helpful in decreasing the spasms and some of the pressure within the lumbar spine but does not help with the pain.    Pain today 8/10 VAS in severity.      Back Pain  This is a chronic problem. The current episode started more than 1 year ago. The problem occurs constantly. The problem is unchanged. The pain is present in the lumbar spine. The quality of the pain is described as aching, burning, shooting, cramping and stabbing. The pain does not radiate. The pain is at a severity of 8/10. The pain is severe. The pain is the same all the time. The symptoms are aggravated by bending, position, standing and twisting. Pertinent negatives include no  abdominal pain, chest pain, dysuria, fever, headaches, numbness or weakness.        PEG Assessment   What number best describes your pain on average in the past week?8  What number best describes how, during the past week, pain has interfered with your enjoyment of life?8  What number best describes how, during the past week, pain has interfered with your general activity?  9    Review of Pertinent Medical Data ---    RADIOLOGY REPORT     FACILITY:  [facility]   UNIT/AGE/GENDER: D.MRI  OP      AGE:26 Y          SEX:M   PATIENT NAME/:  KATHLEEN HIGH    1995   UNIT NUMBER:  [id number]   ACCOUNT NUMBER:  [id number]   ACCESSION NUMBER:  [id number]     EXAMINATION: Magnetic resonance imaging (MRI) of the lumbar spine with and without contrast contrast     HISTORY: 26-year-old male with chronic low back pain, paresthesias. Evaluate for tethered cord     TECHNIQUE: Multiplanar multi-weighted MRI of the lumbar spine was performed with and without intravenous contrast using the standard lumbar spine protocol.     Contrast information: 13.4 mL of Clariscan     COMPARISON: CT of the lumbar spine 2017     FINDINGS:     The alignment of the lumbar spine is normal.  There are no vertebral segmentation anomalies.     No suspicious marrow replacing lesions.  No acute compression fractures.     The conus medullaris terminates at the level of T12-L1.  The distal spinal cord signal intensity is normal.  No evidence for filum terminale lipoma.  Mild disc space narrowing associated with degenerative disc changes at L4-L5 and L5-S1.       Level by level findings:     L1-L2: Negative     L2-L3: Negative     L3-L4: Negative     L4-L5: Disc bulge with subtle central disc protrusion. Mild lateral recess stenosis without contact of the traversing nerve roots. There is mild bilateral foraminal narrowing.     L5-S1: Disc bulge with posterior annular fissuring and a subtle central disc protrusion  "without associated central canal narrowing. There is no foraminal narrowing.     IMPRESSION:     No acute lumbar spine abnormalities.  No MRI findings to suggest tethered cord.     Mild degenerative disc changes at L4-L5 and L5-S1 as detailed above. No moderate or high-grade central canal or foraminal stenosis in the lumbar region.     Dictated by: Pa Edmond M.D.     Images and Report reviewed and interpreted by: Pa Edmond M.D.     <PS><Electronically signed by: Pa Edmond M.D.>   06/22/2022 1456     D: 06/22/2022 1447   T: 06/22/2022 1447    The following portions of the patient's history were reviewed and updated as appropriate: allergies, current medications, past family history, past medical history, past social history, past surgical history and problem list.    Review of Systems   Constitutional: Negative for activity change, chills, fatigue and fever.   HENT: Negative for congestion.    Eyes: Negative for visual disturbance.   Respiratory: Negative for chest tightness and shortness of breath.    Cardiovascular: Negative for chest pain.   Gastrointestinal: Negative for abdominal pain, constipation and diarrhea.   Genitourinary: Positive for frequency. Negative for difficulty urinating and dysuria.   Musculoskeletal: Positive for back pain.   Neurological: Negative for dizziness, weakness, light-headedness, numbness and headaches.   Psychiatric/Behavioral: Positive for sleep disturbance. Negative for agitation, self-injury and suicidal ideas. The patient is nervous/anxious.      I have reviewed and confirmed the accuracy of the ROS as documented by the MA/LPN/RN NELLIE Orona     Vitals:    05/25/23 1455   BP: 107/58   Pulse: 80   Temp: 97.3 °F (36.3 °C)   SpO2: 96%   Weight: 66.7 kg (147 lb)   Height: 180.3 cm (71\")   PainSc:   8   PainLoc: Back         Objective   Physical Exam  Vitals and nursing note reviewed.   Constitutional:       Appearance: Normal appearance. He is normal weight. "   HENT:      Head: Normocephalic.   Pulmonary:      Effort: Pulmonary effort is normal.   Musculoskeletal:      Lumbar back: Spasms and tenderness present. Decreased range of motion.        Back:    Skin:     General: Skin is warm and dry.   Neurological:      General: No focal deficit present.      Mental Status: He is alert and oriented to person, place, and time.      Cranial Nerves: Cranial nerves 2-12 are intact.      Sensory: Sensation is intact.      Motor: Motor function is intact.      Gait: Gait is intact.      Deep Tendon Reflexes:      Reflex Scores:       Patellar reflexes are 2+ on the right side and 2+ on the left side.       Achilles reflexes are 2+ on the right side and 2+ on the left side.  Psychiatric:         Mood and Affect: Mood normal.         Behavior: Behavior normal.         Thought Content: Thought content normal.         Judgment: Judgment normal.         Assessment & Plan   Diagnoses and all orders for this visit:    1. Lumbar radiculitis (Primary)  -     Ambulatory Referral to Orthopedic Surgery    2. Displacement of lumbar intervertebral disc without myelopathy  -     Ambulatory Referral to Orthopedic Surgery    3. Bilateral low back pain without sciatica, unspecified chronicity    4. Muscle spasm  -     baclofen (LIORESAL) 10 MG tablet; Take 1 tablet by mouth 3 (Three) Times a Day.  Dispense: 90 tablet; Refill: 2        --- Follow-up in 8 weeks or sooner as needed  --- Per the patient request I will submit authorization for orthopedic spine evaluation for persistent low back pain with L4-5, L5-S1 disc protrusions.  --- I also recommended to the patient strongly that he resume physical therapy for further evaluation and treatment recommendations to be made.  He is agreeable and is requesting referral to be sent to [facility] near [facility] which is closer to where he lives.  --- I will have the patient return for further evaluation with Dr. Gaming however I have discussed with him on  today that I do not see any other interventional pain procedures providing any benefit for his pain relief.  I have discussed with the patient that he is not a candidate for chronic opiate prescribing and that we would not proceed with any controlled substances.          Dictated utilizing Dragon dictation.

## 2023-06-07 ENCOUNTER — TELEPHONE (OUTPATIENT)
Dept: ORTHOPEDIC SURGERY | Facility: CLINIC | Age: 28
End: 2023-06-07

## 2023-06-07 NOTE — TELEPHONE ENCOUNTER
CALLED PATIENT AND LEFT VM TO CALL BACK TO RESCHEDULE NO SHOW APPOINTMENT THAT WAS ON 6/7/23 AT 3:00PM    CG 6/7/23

## (undated) DEVICE — Device: Brand: PORTEX

## (undated) DEVICE — NDL EPID TUOHY 18G 31/2IN

## (undated) DEVICE — EPIDURAL TRAY: Brand: MEDLINE INDUSTRIES, INC.

## (undated) DEVICE — GLV SURG TRIUMPH PF LTX 7 STRL